# Patient Record
Sex: MALE | Race: WHITE | NOT HISPANIC OR LATINO | Employment: FULL TIME | URBAN - METROPOLITAN AREA
[De-identification: names, ages, dates, MRNs, and addresses within clinical notes are randomized per-mention and may not be internally consistent; named-entity substitution may affect disease eponyms.]

---

## 2017-05-08 ENCOUNTER — APPOINTMENT (OUTPATIENT)
Dept: WOMENS IMAGING | Facility: HOSPITAL | Age: 52
End: 2017-05-08

## 2017-05-08 PROCEDURE — 71035: CPT | Performed by: RADIOLOGY

## 2018-03-29 ENCOUNTER — OFFICE VISIT CONVERTED (OUTPATIENT)
Dept: FAMILY MEDICINE CLINIC | Facility: CLINIC | Age: 53
End: 2018-03-29
Attending: FAMILY MEDICINE

## 2018-07-02 ENCOUNTER — APPOINTMENT (OUTPATIENT)
Dept: WOMENS IMAGING | Facility: HOSPITAL | Age: 53
End: 2018-07-02

## 2018-07-02 PROCEDURE — 71045 X-RAY EXAM CHEST 1 VIEW: CPT | Performed by: RADIOLOGY

## 2018-07-25 ENCOUNTER — APPOINTMENT (OUTPATIENT)
Dept: WOMENS IMAGING | Facility: HOSPITAL | Age: 53
End: 2018-07-25

## 2018-07-25 PROCEDURE — 71047 X-RAY EXAM CHEST 3 VIEWS: CPT | Performed by: RADIOLOGY

## 2018-07-30 ENCOUNTER — OFFICE VISIT CONVERTED (OUTPATIENT)
Dept: FAMILY MEDICINE CLINIC | Facility: CLINIC | Age: 53
End: 2018-07-30
Attending: FAMILY MEDICINE

## 2018-10-08 ENCOUNTER — OFFICE VISIT CONVERTED (OUTPATIENT)
Dept: FAMILY MEDICINE CLINIC | Facility: CLINIC | Age: 53
End: 2018-10-08
Attending: NURSE PRACTITIONER

## 2018-10-08 ENCOUNTER — CONVERSION ENCOUNTER (OUTPATIENT)
Dept: FAMILY MEDICINE CLINIC | Facility: CLINIC | Age: 53
End: 2018-10-08

## 2018-11-15 ENCOUNTER — CONVERSION ENCOUNTER (OUTPATIENT)
Dept: FAMILY MEDICINE CLINIC | Facility: CLINIC | Age: 53
End: 2018-11-15

## 2018-11-27 ENCOUNTER — APPOINTMENT (OUTPATIENT)
Dept: WOMENS IMAGING | Facility: HOSPITAL | Age: 53
End: 2018-11-27

## 2018-11-27 PROCEDURE — 71045 X-RAY EXAM CHEST 1 VIEW: CPT | Performed by: RADIOLOGY

## 2019-01-15 ENCOUNTER — HOSPITAL ENCOUNTER (OUTPATIENT)
Dept: OTHER | Facility: HOSPITAL | Age: 54
Discharge: HOME | End: 2019-01-15

## 2019-01-15 LAB — PSA SERPL-MCNC: 0.25 NG/ML (ref 0–4)

## 2019-11-26 ENCOUNTER — HOSPITAL ENCOUNTER (OUTPATIENT)
Dept: FAMILY MEDICINE CLINIC | Facility: CLINIC | Age: 54
Discharge: HOME | End: 2019-11-26
Attending: NURSE PRACTITIONER

## 2019-11-26 ENCOUNTER — APPOINTMENT (OUTPATIENT)
Dept: WOMENS IMAGING | Facility: HOSPITAL | Age: 54
End: 2019-11-26

## 2019-12-05 VITALS
RESPIRATION RATE: 18 BRPM | DIASTOLIC BLOOD PRESSURE: 65 MMHG | DIASTOLIC BLOOD PRESSURE: 78 MMHG | SYSTOLIC BLOOD PRESSURE: 120 MMHG | HEART RATE: 75 BPM | SYSTOLIC BLOOD PRESSURE: 101 MMHG | DIASTOLIC BLOOD PRESSURE: 90 MMHG | DIASTOLIC BLOOD PRESSURE: 57 MMHG | DIASTOLIC BLOOD PRESSURE: 55 MMHG | SYSTOLIC BLOOD PRESSURE: 90 MMHG | RESPIRATION RATE: 16 BRPM | RESPIRATION RATE: 19 BRPM | HEART RATE: 79 BPM | DIASTOLIC BLOOD PRESSURE: 71 MMHG | SYSTOLIC BLOOD PRESSURE: 125 MMHG | OXYGEN SATURATION: 99 % | SYSTOLIC BLOOD PRESSURE: 98 MMHG | DIASTOLIC BLOOD PRESSURE: 75 MMHG | DIASTOLIC BLOOD PRESSURE: 69 MMHG | HEART RATE: 74 BPM | RESPIRATION RATE: 15 BRPM | HEIGHT: 71 IN | DIASTOLIC BLOOD PRESSURE: 61 MMHG | HEART RATE: 83 BPM | TEMPERATURE: 96.7 F | OXYGEN SATURATION: 91 % | HEART RATE: 76 BPM | DIASTOLIC BLOOD PRESSURE: 64 MMHG | DIASTOLIC BLOOD PRESSURE: 62 MMHG | DIASTOLIC BLOOD PRESSURE: 83 MMHG | RESPIRATION RATE: 20 BRPM | SYSTOLIC BLOOD PRESSURE: 99 MMHG | SYSTOLIC BLOOD PRESSURE: 96 MMHG | DIASTOLIC BLOOD PRESSURE: 60 MMHG | HEART RATE: 71 BPM | OXYGEN SATURATION: 95 % | WEIGHT: 215 LBS | RESPIRATION RATE: 21 BRPM | OXYGEN SATURATION: 93 % | SYSTOLIC BLOOD PRESSURE: 106 MMHG | OXYGEN SATURATION: 96 % | DIASTOLIC BLOOD PRESSURE: 56 MMHG | OXYGEN SATURATION: 98 % | RESPIRATION RATE: 22 BRPM | SYSTOLIC BLOOD PRESSURE: 112 MMHG | HEART RATE: 73 BPM | SYSTOLIC BLOOD PRESSURE: 100 MMHG | OXYGEN SATURATION: 97 % | TEMPERATURE: 97 F | HEART RATE: 86 BPM | SYSTOLIC BLOOD PRESSURE: 123 MMHG | HEART RATE: 85 BPM

## 2019-12-06 ENCOUNTER — OFFICE (AMBULATORY)
Dept: URBAN - METROPOLITAN AREA PATHOLOGY 4 | Facility: PATHOLOGY | Age: 54
End: 2019-12-06
Payer: COMMERCIAL

## 2019-12-06 ENCOUNTER — AMBULATORY SURGICAL CENTER (AMBULATORY)
Dept: URBAN - METROPOLITAN AREA SURGERY 17 | Facility: SURGERY | Age: 54
End: 2019-12-06
Payer: COMMERCIAL

## 2019-12-06 DIAGNOSIS — D12.2 BENIGN NEOPLASM OF ASCENDING COLON: ICD-10-CM

## 2019-12-06 DIAGNOSIS — Z86.010 PERSONAL HISTORY OF COLONIC POLYPS: ICD-10-CM

## 2019-12-06 DIAGNOSIS — K57.30 DIVERTICULOSIS OF LARGE INTESTINE WITHOUT PERFORATION OR ABS: ICD-10-CM

## 2019-12-06 LAB
GI HISTOLOGY: A. UNSPECIFIED: (no result)
GI HISTOLOGY: PDF REPORT: (no result)

## 2019-12-06 PROCEDURE — 45385 COLONOSCOPY W/LESION REMOVAL: CPT | Mod: 33 | Performed by: INTERNAL MEDICINE

## 2019-12-06 PROCEDURE — 88305 TISSUE EXAM BY PATHOLOGIST: CPT | Mod: 33 | Performed by: INTERNAL MEDICINE

## 2019-12-06 NOTE — SERVICEHPINOTES
53 yo M with h/o HTN, colon polyps. Last CN 12/2016 with multiple polyps removed on previous colonoscopy. GM with liver dz, ovarian ca. He denies having any GI symptoms.Discussed r/b/a prior

## 2020-09-25 ENCOUNTER — HOSPITAL ENCOUNTER (OUTPATIENT)
Dept: OTHER | Facility: HOSPITAL | Age: 55
Discharge: HOME | End: 2020-09-25

## 2020-10-06 PROCEDURE — U0003 INFECTIOUS AGENT DETECTION BY NUCLEIC ACID (DNA OR RNA); SEVERE ACUTE RESPIRATORY SYNDROME CORONAVIRUS 2 (SARS-COV-2) (CORONAVIRUS DISEASE [COVID-19]), AMPLIFIED PROBE TECHNIQUE, MAKING USE OF HIGH THROUGHPUT TECHNOLOGIES AS DESCRIBED BY CMS-2020-01-R: HCPCS | Performed by: NURSE PRACTITIONER

## 2021-02-02 ENCOUNTER — HOSPITAL ENCOUNTER (OUTPATIENT)
Dept: FAMILY MEDICINE CLINIC | Facility: CLINIC | Age: 56
Discharge: HOME | End: 2021-02-02
Attending: NURSE PRACTITIONER

## 2021-05-07 NOTE — PROGRESS NOTES
Progress Note      Patient Name: Patel Monreal   Patient ID: [id number]   Sex: Male   YOB: 1965        Visit Date: July 30, 2018    Provider: Deneen Escobar MD   Location: [facility]   Location Address: [address] [place] [address]  [id number]   Location Phone: [phone]          Chief Complaint     Wellness Exam       History Of Present Illness  Patel Monreal is a 53 year old /White male who presents for evaluation and treatment of:      Wellness exam    Patient comes in for his wellness exam. He denies complaints or concerns at this time. His last colonoscopy was 2 years ago (few polyps per patient; return in 5 years; had colonoscopy 1 year prior with multiple polyps so they had him return 1 year later. He denies blood or melena in stools or unintentional weight loss. Dad alive at 87 yo with HTN; CVA 87 yo. Mom alive at 83 yo with HTN.          Past Medical History  Disease Name Date Onset Notes   BPH with retention --  --          Past Surgical History  Procedure Name Date Notes   Left knee arthroscopy with debridement 1996 --    Right knee arthroscopy with debridement 1994, 1998 --    Right shoulder arthroscopy 2000 --          Medication List  Name Date Started Instructions   alfuzosin 10 mg oral tablet extended release 24 hr  take 1 tablet (10 mg) by oral route once daily   aspirin oral  --          Allergy List  Allergen Name Date Reaction Notes   NO KNOWN DRUG ALLERGIES --  --  --          Social History  Finding Status Start/Stop Quantity Notes   Tobacco Never --/-- --  --          Review of Systems  · Constitutional  o Denies  o : fever, headache, chills  · Eyes  o Denies  o : eye pain, changes in vision, double vision  · HENT  o Denies  o : nasal congestion, nasal discharge, postnasal drip, sore throat, tinnitus, ear pain  · Breasts  o Denies  o : lumps  · Cardiovascular  o Denies  o : chest pain, palpitations  · Respiratory  o Denies  o : shortness of  "breath, wheezing, cough  · Gastrointestinal  o Denies  o : nausea, vomiting, abdominal pain, blood in stools  · Genitourinary  o Denies  o : urgency, frequency, dysuria, hematuria  · Integument  o Denies  o : rash      Vitals  Date Time BP Position Site L\R Cuff Size HR RR TEMP(F) WT  HT  BMI kg/m2 BSA m2 O2 Sat HC       07/30/2018 08:37 /74 Sitting    74 - R  97.6 227lbs 0oz 5'  11\" 31.66 2.27 96 %           Physical Examination  · Constitutional  o Appearance  o : well developed, well-nourished, no acute distress  · Eyes  o Conjunctivae  o : conjunctivae normal  · Ears, Nose, Mouth and Throat  o Nose  o :   § External Nose  § : no lesions noted  · Neck  o Inspection/Palpation  o : normal appearance, trachea midline, neck supple  · Respiratory  o Respiratory Effort  o : breathing unlabored  o Inspection of Chest  o : chest rise symmetric bilaterally  o Auscultation of Lungs  o : clear to auscultation bilaterally throughout inspiration and expiration; no wheezes, rhonchi, rales, rubs  · Cardiovascular  o Heart  o :   § Auscultation of Heart  § : regular rate and rhythm, no murmurs, gallops or rubs  o Peripheral Vascular System  o :   § Extremities  § : no edema  · Psychiatric  o Mood and Affect  o : mood normal, affect appropriate              Assessment  · Wellness examination     V70.0/Z00.00  Patient had plant labs in April 2018 so will get a copy of those. Needs Hep C with next blood draw. All other screening UTD.      Plan  · Orders  o ACO-39: Current medications updated and reviewed () - V70.0/Z00.00 - 07/30/2018  o ACO-18: Negative screen for clinical depression using a standardized tool () - V70.0/Z00.00 - 07/30/2018  · Instructions  o Patient was educated/instructed on their diagnosis, treatment and medications prior to discharge from the clinic today.            Electronically Signed by: Deneen Escobar MD -Author on July 30, 2018 10:32:26 AM  "

## 2021-05-07 NOTE — PROGRESS NOTES
Progress Note      Patient Name: Patel Monreal   Patient ID: [id number]   Sex: Male   YOB: 1965        Visit Date: October 8, 2018    Provider: MANDEEP Parker   Location: [facility]   Location Address: [address] [place] [address]  [id number]   Location Phone: [phone]          Chief Complaint     PATIENT IS HERE FOR A RETURN TO WORK FROM HAVING SURGERY FOR A TRIGGER FINGER.       History Of Present Illness  Patel Monreal is a 53 year old /White male who presents for evaluation and treatment of:      RTW     He had surgery for trigger finger right hand  Sept 21. Saw  9-28. He was given 1 week of light duty, then RTW 10-5 no restrictions.     He works in Maintenance. Right hand dominant.  is not as good. Painful to . Cannot totally flex finger yet.  He is concerned he will not be able to  pipe wrench adequately without dropping something.            Past Medical History  Disease Name Date Onset Notes   BPH with retention --  --          Past Surgical History  Procedure Name Date Notes   Left knee arthroscopy with debridement 1996 --    Right knee arthroscopy with debridement 1994, 1998 --    Right shoulder arthroscopy 2000 --          Medication List  Name Date Started Instructions   alfuzosin 10 mg oral tablet extended release 24 hr  take 1 tablet (10 mg) by oral route once daily   aspirin oral  --          Allergy List  Allergen Name Date Reaction Notes   NO KNOWN DRUG ALLERGIES --  --  --          Social History  Finding Status Start/Stop Quantity Notes   Tobacco Never --/-- --  --          Review of Systems  · Constitutional  o Admits  o : good general health lately  · Integument  o Admits  o : scar is well healed  · Musculoskeletal  o Admits  o : limited ROM to middle finger, still painful with       Vitals  Date Time BP Position Site L\R Cuff Size HR RR TEMP(F) WT  HT  BMI kg/m2 BSA m2 O2 Sat HC       10/08/2018 08:22 /80  "Sitting    105 - R  97.4 228lbs 4oz 5'  11\" 31.83 2.28 97 %           Physical Examination  · Constitutional  o Appearance  o : well developed, well-nourished, in no acute distress  · Head and Face  o HEENT  o : Unremarkable  · Eyes  o Conjunctivae  o : conjunctivae normal  o Pupils and Irises  o : pupils equal and round  · Ears, Nose, Mouth and Throat  o Ears  o :   § External Ears  § : appearance within normal limits, no lesions present  o Nose  o :   § External Nose  § : appearance normal  o Oral Cavity  o :   § Oral Mucosa  § : oral mucosa normal  § Lips  § : lip appearance normal  · Neck  o Inspection/Palpation  o : supple  · Respiratory  o Respiratory Effort  o : breathing unlabored  o Auscultation of Lungs  o : clear to ascultation  · Cardiovascular  o Heart  o :   § Auscultation of Heart  § : regular rate and rhythm  o Peripheral Vascular System  o :   § Extremities  § : right hand: fingers with good capillary refill  · Musculoskeletal  o General  o :   § General Musculoskeletal  § : Muscle tone, strength, and development grossly normal.  o Extremeties/Joint  o : Right Hand:  is approximately 50 % less than left. Cannot close hand completely. C/O pain with gripping my fingers. Slightly swollen.   · Skin and Subcutaneous Tissue  o General Inspection  o : warm and dry, not obvious abnormal lesions, scar on palm is well healed  · Neurologic  o Gait and Station  o :   § Gait Screening  § : normal gait  · Psychiatric  o Mood and Affect  o : mood normal, affect appropriate          Assessment  · Return to work exam     V72.85/Z76.89  · Trigger finger, right middle finger     727.03/M65.331      Plan  · Orders  o ACO-39: Current medications updated and reviewed () - - 10/08/2018  · Instructions  o Patient was educated/instructed on their diagnosis, treatment and medications prior to discharge from the clinic today.  o He does not appear prepared to return to work at this time. He is still experiencing pain " with gripping of the right hand. He is right-hand dominant. 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 I advised that he should follow up with his surgeon. He needs to discuss his work responsibilities. Perhaps request additional time off to improve flexibility and  strength of hand without significant pain.  o After patient returns home and contacts his surgeon's office, he calls back to say his surgeon is out of the country and will be unable to see him for several days. On second thought, patient thinks he will be able to return to work. I ask him to go into his garage and to  some tools and use them to assess his ability to hold the tool and use it effectively. He returns the call later in the day to report he has been able to do so by holding his finger slightly extended. I will return him to work.            Electronically Signed by: MANDEEP Parker -Author on October 8, 2018 10:16:10 AM

## 2021-05-07 NOTE — PROGRESS NOTES
Progress Note      Patient Name: Patel Monreal   Patient ID: [id number]   Sex: Male   YOB: 1965        Visit Date: March 29, 2018    Provider: Deneen Escobar MD   Location: [facility]   Location Address: [address] [place] [address]  [id number]   Location Phone: [phone]          Chief Complaint     Splinter in left hand X 2 weeks       History Of Present Illness  Patel Monreal is a 52 year old /White male who presents for evaluation and treatment of:      Splinter in left hand    Patient reports getting a splinter in his left hand 2 weeks ago. He is sure there is a splinter as he remembers the exact moment it happened (and he had other splinters as well). His daughter tried digging it out and he tried soaking his hand in peroxide but the splinter remains. Pain with using that hand (he has a trigger finger in his right hand so has been preferentially using the left). Denies fevers or erythema/drainage.          Medication List  Name Date Started Instructions   alfuzosin 10 mg oral tablet extended release 24 hr  take 1 tablet (10 mg) by oral route once daily   diclofenac sodium 75 mg oral tablet,delayed release (DR/EC)  take 1 tablet (75 mg) by oral route 2 times per day as needed         Allergy List  Allergen Name Date Reaction Notes   NO KNOWN DRUG ALLERGIES --  --  --          Review of Systems  · Constitutional  o Denies  o : fever, headache, chills  · Eyes  o Denies  o : eye pain, changes in vision, double vision  · HENT  o Denies  o : nasal congestion, nasal discharge, postnasal drip, sore throat, tinnitus, ear pain  · Breasts  o Denies  o : lumps  · Cardiovascular  o Denies  o : chest pain, palpitations  · Respiratory  o Denies  o : shortness of breath, wheezing, cough  · Gastrointestinal  o Denies  o : nausea, vomiting, abdominal pain, blood in stools  · Genitourinary  o Denies  o : urgency, frequency, dysuria, hematuria  · Integument  o Denies  o :  "rash      Vitals  Date Time BP Position Site L\R Cuff Size HR RR TEMP(F) WT  HT  BMI kg/m2 BSA m2 O2 Sat HC       03/29/2018 04:42 /78 Sitting    78 - R   238lbs 0oz 5'  11\" 33.19 2.33 95 %           Physical Examination  · Constitutional  o Appearance  o : well developed, well-nourished, in no acute distress  · Eyes  o Conjunctivae  o : Conjunctivae normal  · Neck  o Inspection/Palpation  o : neck supple  · Respiratory  o Respiratory Effort  o : breathing unlabored  · Psychiatric  o General  o : normal affect and calm     LEFT HAND:  splinter present palmar aspect of hand below 2nd finger; callus forming around splinter (small, black speck visualized); no surrounding erythema or ecchymosis; no surrounding fluid or pus or swelling                 Assessment  · Splinter     919.6/T14.8XXA  With an 11 blade, callus was cut away from around the splinter. Splinted noted to be gone after cutting away callus and patient felt relief of symptoms following procedure. Discussed follow-up if symptoms persist.      Plan  · Orders  o ACO-39: Current medications updated and reviewed () - - 03/29/2018  · Instructions  o Patient was educated/instructed on their diagnosis, treatment and medications prior to discharge from the clinic today.  · Disposition  o Call or Return if symptoms worsen or persist.            Electronically Signed by: Deneen Escobar MD -Author on March 29, 2018 05:37:26 PM  "

## 2021-05-09 VITALS
WEIGHT: 233 LBS | TEMPERATURE: 98.3 F | SYSTOLIC BLOOD PRESSURE: 128 MMHG | HEART RATE: 84 BPM | BODY MASS INDEX: 32.62 KG/M2 | DIASTOLIC BLOOD PRESSURE: 84 MMHG | OXYGEN SATURATION: 96 % | HEIGHT: 71 IN

## 2021-05-09 VITALS
BODY MASS INDEX: 31.95 KG/M2 | HEIGHT: 71 IN | TEMPERATURE: 97.4 F | OXYGEN SATURATION: 97 % | HEART RATE: 105 BPM | WEIGHT: 228.25 LBS | SYSTOLIC BLOOD PRESSURE: 132 MMHG | DIASTOLIC BLOOD PRESSURE: 80 MMHG

## 2021-05-09 VITALS
DIASTOLIC BLOOD PRESSURE: 74 MMHG | SYSTOLIC BLOOD PRESSURE: 112 MMHG | WEIGHT: 227 LBS | TEMPERATURE: 97.6 F | HEIGHT: 71 IN | OXYGEN SATURATION: 96 % | BODY MASS INDEX: 31.78 KG/M2 | HEART RATE: 74 BPM

## 2021-05-09 VITALS
BODY MASS INDEX: 33.32 KG/M2 | WEIGHT: 238 LBS | OXYGEN SATURATION: 95 % | DIASTOLIC BLOOD PRESSURE: 78 MMHG | HEIGHT: 71 IN | HEART RATE: 78 BPM | SYSTOLIC BLOOD PRESSURE: 118 MMHG

## 2022-05-12 ENCOUNTER — CLINICAL SUPPORT (OUTPATIENT)
Dept: FAMILY MEDICINE CLINIC | Facility: CLINIC | Age: 57
End: 2022-05-12

## 2022-05-12 DIAGNOSIS — Z13.9 SCREENING DUE: Primary | ICD-10-CM

## 2022-05-24 ENCOUNTER — OFFICE VISIT (OUTPATIENT)
Dept: ORTHOPEDIC SURGERY | Facility: CLINIC | Age: 57
End: 2022-05-24

## 2022-05-24 VITALS — WEIGHT: 223.4 LBS | HEIGHT: 71 IN | BODY MASS INDEX: 31.27 KG/M2 | HEART RATE: 84 BPM | OXYGEN SATURATION: 98 %

## 2022-05-24 DIAGNOSIS — S83.242A ACUTE MEDIAL MENISCUS TEAR OF LEFT KNEE, INITIAL ENCOUNTER: ICD-10-CM

## 2022-05-24 DIAGNOSIS — M25.562 LEFT KNEE PAIN, UNSPECIFIED CHRONICITY: Primary | ICD-10-CM

## 2022-05-24 PROCEDURE — 99203 OFFICE O/P NEW LOW 30 MIN: CPT | Performed by: ORTHOPAEDIC SURGERY

## 2022-05-24 NOTE — PROGRESS NOTES
"Chief Complaint  Initial Evaluation of the Left Knee     Subjective      Patel Monreal presents to [facility] for evaluation of the left knee. He reports left knee pain. He has pain the medial knee with for about 2 months. 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 He has no injury or trauma.     No Known Allergies     Social History     Socioeconomic History   • Marital status: Unknown   Tobacco Use   • Smoking status: Never Smoker   • Smokeless tobacco: Former User     Types: Chew   Vaping Use   • Vaping Use: Never used   Substance and Sexual Activity   • Alcohol use: Yes     Comment: rare        Review of Systems     Objective   Vital Signs:   Pulse 84   Ht 180.3 cm (71\")   Wt 101 kg (223 lb 6.4 oz)   SpO2 98%   BMI 31.16 kg/m²       Physical Exam  Constitutional:       Appearance: Normal appearance. The patient is well-developed and normal weight.   HENT:      Head: Normocephalic.      Right Ear: Hearing and external ear normal.      Left Ear: Hearing and external ear normal.      Nose: Nose normal.   Eyes:      Conjunctiva/sclera: Conjunctivae normal.   Cardiovascular:      Rate and Rhythm: Normal rate.   Pulmonary:      Effort: Pulmonary effort is normal.      Breath sounds: No wheezing or rales.   Abdominal:      Palpations: Abdomen is soft.      Tenderness: There is no abdominal tenderness.   Musculoskeletal:      Cervical back: Normal range of motion.   Skin:     Findings: No rash.   Neurological:      Mental Status: The patient is alert and oriented to person, place, and time.   Psychiatric:         Mood and Affect: Mood and affect normal.         Judgment: Judgment normal.       Ortho Exam      Left knee- ROM 0-135 degrees. Stable to varus/valgus stress. Stable to anterior/posterior drawer. Negative Lachman's. Pain with Araseli's. Positive EHL, FHL, GS and TA. Sensation intact to all 5 nerves of the foot. Positive pulses. Knee Extensor Mechanism  " Intact.     Procedures    X-Ray Report:  Left knee(s) X-Ray  Indication: Evaluation of left knee pain  AP, Lateral, Standing and Sunrise view(s)  Findings: no acute fracture. No dislocation or subluxation. No significant degenerative changes.   Prior studies available for comparison: no         Imaging Results (Most Recent)     Procedure Component Value Units Date/Time    XR Knee 3 View Left [[id number]] Resulted: 05/24/22 1620     Updated: 05/24/22 1623           Result Review :       XR Chest 1 View    Result Date: 5/12/2022  Narrative: PROCEDURE: XR CHEST 1 VW  COMPARISON: [facility], CR, AP/PA CHEST FOR ASBESTOS SCREENING, 11/26/2019, 15:52.  [facility], CR, AP/PA CHEST FOR ASBESTOS SCREENING, 2/02/2021, 15:46.  INDICATIONS: screening  FINDINGS:   The lungs are well-expanded. The heart and pulmonary vasculature are within normal limits. No pleural effusions are identified. There are no active appearing infiltrates.  IMPRESSION: No active disease.  NILSA DARDEN MD       Electronically Signed and Approved By: NILSA DARDEN MD on 5/12/2022 at 17:13                      Assessment and Plan     Diagnoses and all orders for this visit:    1. Left knee pain, unspecified chronicity (Primary)  -     XR Knee 3 View Left    2. Acute medial meniscus tear of left knee, initial encounter      Discussed the treatment plan with the patient.  I reviewed the x-rays that were obtained today with the patient. The patient has pain with Araseli Testing. Plan for MRI of the left knee to evaluate the medial meniscus.     Call or return if worsening symptoms.    Follow Up     After MRI      Patient was given instructions and counseling regarding his condition or for health maintenance advice. Please see specific information pulled into the AVS if appropriate.     Scribed for Major Hickman MD by Lucia Peters.  05/24/22   16:37 EDT    I have personally performed the services  described in this document as scribed by the above individual and it is both accurate and complete. Major Hickman MD 05/25/22

## 2022-06-09 ENCOUNTER — HOSPITAL ENCOUNTER (OUTPATIENT)
Dept: MRI IMAGING | Facility: HOSPITAL | Age: 57
Discharge: HOME | End: 2022-06-09
Admitting: ORTHOPAEDIC SURGERY

## 2022-06-09 DIAGNOSIS — S83.242A ACUTE MEDIAL MENISCUS TEAR OF LEFT KNEE, INITIAL ENCOUNTER: ICD-10-CM

## 2022-06-09 DIAGNOSIS — M25.562 LEFT KNEE PAIN, UNSPECIFIED CHRONICITY: ICD-10-CM

## 2022-06-09 PROCEDURE — 73721 MRI JNT OF LWR EXTRE W/O DYE: CPT

## 2022-06-14 ENCOUNTER — OFFICE VISIT (OUTPATIENT)
Dept: ORTHOPEDIC SURGERY | Facility: CLINIC | Age: 57
End: 2022-06-14

## 2022-06-14 VITALS — BODY MASS INDEX: 31.36 KG/M2 | HEART RATE: 92 BPM | HEIGHT: 71 IN | WEIGHT: 224 LBS | OXYGEN SATURATION: 96 %

## 2022-06-14 DIAGNOSIS — S83.242S ACUTE MEDIAL MENISCUS TEAR OF LEFT KNEE, SEQUELA: ICD-10-CM

## 2022-06-14 DIAGNOSIS — I73.89: ICD-10-CM

## 2022-06-14 DIAGNOSIS — M71.22 BAKER CYST, LEFT: Primary | ICD-10-CM

## 2022-06-14 DIAGNOSIS — M17.12 PRIMARY OSTEOARTHRITIS OF LEFT KNEE: ICD-10-CM

## 2022-06-14 PROCEDURE — 99213 OFFICE O/P EST LOW 20 MIN: CPT | Performed by: ORTHOPAEDIC SURGERY

## 2022-06-14 PROCEDURE — 20610 DRAIN/INJ JOINT/BURSA W/O US: CPT | Performed by: ORTHOPAEDIC SURGERY

## 2022-06-14 RX ADMIN — TRIAMCINOLONE ACETONIDE 40 MG: 40 INJECTION, SUSPENSION INTRA-ARTICULAR; INTRAMUSCULAR at 16:33

## 2022-06-14 RX ADMIN — LIDOCAINE HYDROCHLORIDE 5 ML: 10 INJECTION, SOLUTION INFILTRATION; PERINEURAL at 16:33

## 2022-06-14 NOTE — PROGRESS NOTES
"Chief Complaint  Pain and Follow-up of the Left Knee     Subjective      Patel Monreal presents to [facility] for follow up evaluation of the left knee. The patient recently had an MRI and is here today for those results. To review, He has pain the medial left knee with for about 2 months. 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 He has no injury or trauma.     No Known Allergies     Social History     Socioeconomic History   • Marital status: Unknown   Tobacco Use   • Smoking status: Never Smoker   • Smokeless tobacco: Former User     Types: Chew   Vaping Use   • Vaping Use: Never used   Substance and Sexual Activity   • Alcohol use: Yes     Comment: rare        Review of Systems     Objective   Vital Signs:   Pulse 92   Ht 180.3 cm (71\")   Wt 102 kg (224 lb)   SpO2 96%   BMI 31.24 kg/m²       Physical Exam  Constitutional:       Appearance: Normal appearance. The patient is well-developed and normal weight.   HENT:      Head: Normocephalic.      Right Ear: Hearing and external ear normal.      Left Ear: Hearing and external ear normal.      Nose: Nose normal.   Eyes:      Conjunctiva/sclera: Conjunctivae normal.   Cardiovascular:      Rate and Rhythm: Normal rate.   Pulmonary:      Effort: Pulmonary effort is normal.      Breath sounds: No wheezing or rales.   Abdominal:      Palpations: Abdomen is soft.      Tenderness: There is no abdominal tenderness.   Musculoskeletal:      Cervical back: Normal range of motion.   Skin:     Findings: No rash.   Neurological:      Mental Status: The patient is alert and oriented to person, place, and time.   Psychiatric:         Mood and Affect: Mood and affect normal.         Judgment: Judgment normal.       Ortho Exam      Left knee- ROM 0-135 degrees. Stable to varus/valgus stress. Stable to anterior/posterior drawer. Negative Lachman's. Pain with Araseli's. Positive EHL, FHL, GS and TA. Sensation intact to all 5 nerves " of the foot. Positive pulses. Knee Extensor Mechanism  Intact.     Large Joint Arthrocentesis: L knee  Date/Time: 6/14/2022 4:33 PM  Consent given by: patient  Site marked: site marked  Timeout: Immediately prior to procedure a time out was called to verify the correct patient, procedure, equipment, support staff and site/side marked as required   Supporting Documentation  Indications: pain   Procedure Details  Location: knee - L knee  Needle gauge: 21G.  Medications administered: 40 mg triamcinolone acetonide 40 MG/ML; 5 mL lidocaine 1 %  Patient tolerance: patient tolerated the procedure well with no immediate complications          Imaging Results (Most Recent)     None           Result Review :       XR Knee 3 View Left    Result Date: 5/24/2022  Narrative:   X-Ray Report: Left knee(s) X-Ray Indication: Evaluation of left knee pain AP, Lateral, Standing and Sunrise view(s) Findings: no acute fracture. No dislocation or subluxation. No significant degenerative changes. Prior studies available for comparison: no     MRI Knee Left Without Contrast    Result Date: 6/10/2022  Narrative: PROCEDURE: MRI KNEE LEFT  WO CONTRAST  COMPARISON: [facility], MR, KNEE LEFT, 8/25/2011, 15:56.  [facility] , CR, XR KNEE 3 VW LEFT, 5/24/2022, 16:25.  INDICATIONS: LEFT KNEE MMT      TECHNIQUE: A complete multi-planar MRI was performed.   FINDINGS:  No fracture or malalignment is identified.  Marrow signal appears normal.  There is a complex tear of the medial meniscal posterior horn exiting the superior and inferior surfaces.  Postsurgical changes are noted in Hoffa's fat pad.  The medial meniscal body appears diminutive, possibly related to a previous partial meniscectomy and or tear.  A similar appearance was noted on the 8/25/2011 exam.  No tear of the lateral meniscus is seen.  The cruciate ligaments, medial collateral ligament, lateral collateral ligament complex, patellar retinacula and extensor  mechanism appear unremarkable.  There is full-thickness fissuring of the patellar cartilage along the superior vertical ridge.  Cartilage signal changes in the medial and lateral compartments is consistent with chondromalacia.  Mild to moderate cartilage thinning is noted in the medial and lateral compartments.  No loose body is seen.  A 1.1 cm popliteal cyst is present.  Mild prepatellar edema is noted.  Along the anterior aspect of the popliteal artery is a 1.0 cm x 2.0 cm rounded T2 high signal focus.  Intravenous contrast was not given to assess for abnormal enhancement.      Impression:   1. Chronic tears of the medial meniscal body and posterior horn.  Correlate clinically for a history of partial meniscectomy 2. Mild osteoarthrosis 3. 1.1 cm popliteal cyst 4. 2.0 cm x 1.0 cm cystic focus along the anterior aspect of the popliteal artery.  This is suspected to represent cystic adventitial disease arising from the artery.  A synovial cyst or ganglion is also in the differential.     Simón Christie M.D.       Electronically Signed and Approved By: Simón Christie M.D. on 6/10/2022 at 13:15                      Assessment and Plan     Diagnoses and all orders for this visit:    1. Baker cyst, left (Primary)    2. Primary osteoarthritis of left knee    3. Acute medial meniscus tear of left knee, sequela    4. 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  I reviewed the MRI with the patient. Plan for a referral to a vascular Doctor about the findings concerning the artery. Discussed the risks and benefits of a left knee steroid injection. The patient expressed understanding and wished to proceed. He tolerated the injection well.     Call or return if worsening symptoms.    Follow Up     6 weeks    Patient was given instructions and counseling regarding his condition or for health maintenance advice. Please see specific information pulled into the AVS if  appropriate.     Scribed for Major Hickman MD by Lucia Peters.  06/14/22   16:12 EDT    I have personally performed the services described in this document as scribed by the above individual and it is both accurate and complete. Major Hickman MD 06/15/22

## 2022-06-15 RX ORDER — TRIAMCINOLONE ACETONIDE 40 MG/ML
40 INJECTION, SUSPENSION INTRA-ARTICULAR; INTRAMUSCULAR
Status: COMPLETED | OUTPATIENT
Start: 2022-06-14 | End: 2022-06-14

## 2022-06-15 RX ORDER — LIDOCAINE HYDROCHLORIDE 10 MG/ML
5 INJECTION, SOLUTION INFILTRATION; PERINEURAL
Status: COMPLETED | OUTPATIENT
Start: 2022-06-14 | End: 2022-06-14

## 2022-07-04 ENCOUNTER — HOSPITAL ENCOUNTER (EMERGENCY)
Facility: HOSPITAL | Age: 57
Discharge: HOME | End: 2022-07-04
Attending: EMERGENCY MEDICINE | Admitting: EMERGENCY MEDICINE

## 2022-07-04 ENCOUNTER — APPOINTMENT (OUTPATIENT)
Dept: CT IMAGING | Facility: HOSPITAL | Age: 57
End: 2022-07-04

## 2022-07-04 VITALS
HEART RATE: 80 BPM | DIASTOLIC BLOOD PRESSURE: 85 MMHG | RESPIRATION RATE: 15 BRPM | OXYGEN SATURATION: 95 % | SYSTOLIC BLOOD PRESSURE: 131 MMHG | TEMPERATURE: 97.9 F

## 2022-07-04 DIAGNOSIS — K57.92 DIVERTICULITIS: Primary | ICD-10-CM

## 2022-07-04 LAB
ALBUMIN SERPL-MCNC: 4.3 G/DL (ref 3.5–5.2)
ALBUMIN/GLOB SERPL: 1.3 G/DL
ALP SERPL-CCNC: 69 U/L (ref 39–117)
ALT SERPL W P-5'-P-CCNC: 45 U/L (ref 1–41)
ANION GAP SERPL CALCULATED.3IONS-SCNC: 9.5 MMOL/L (ref 5–15)
AST SERPL-CCNC: 28 U/L (ref 1–40)
BASOPHILS # BLD AUTO: 0.06 10*3/MM3 (ref 0–0.2)
BASOPHILS NFR BLD AUTO: 0.5 % (ref 0–1.5)
BILIRUB SERPL-MCNC: 0.8 MG/DL (ref 0–1.2)
BILIRUB UR QL STRIP: NEGATIVE
BUN SERPL-MCNC: 13 MG/DL (ref 6–20)
BUN/CREAT SERPL: 13.5 (ref 7–25)
CALCIUM SPEC-SCNC: 9.6 MG/DL (ref 8.6–10.5)
CHLORIDE SERPL-SCNC: 105 MMOL/L (ref 98–107)
CLARITY UR: CLEAR
CO2 SERPL-SCNC: 25.5 MMOL/L (ref 22–29)
COLOR UR: YELLOW
CREAT SERPL-MCNC: 0.96 MG/DL (ref 0.76–1.27)
DEPRECATED RDW RBC AUTO: 38.8 FL (ref 37–54)
EGFRCR SERPLBLD CKD-EPI 2021: 92.2 ML/MIN/1.73
EOSINOPHIL # BLD AUTO: 0.08 10*3/MM3 (ref 0–0.4)
EOSINOPHIL NFR BLD AUTO: 0.6 % (ref 0.3–6.2)
ERYTHROCYTE [DISTWIDTH] IN BLOOD BY AUTOMATED COUNT: 12.6 % (ref 12.3–15.4)
GLOBULIN UR ELPH-MCNC: 3.3 GM/DL
GLUCOSE SERPL-MCNC: 101 MG/DL (ref 65–99)
GLUCOSE UR STRIP-MCNC: NEGATIVE MG/DL
HCT VFR BLD AUTO: 44 % (ref 37.5–51)
HGB BLD-MCNC: 15 G/DL (ref 13–17.7)
HGB UR QL STRIP.AUTO: NEGATIVE
HOLD SPECIMEN: NORMAL
IMM GRANULOCYTES # BLD AUTO: 0.07 10*3/MM3 (ref 0–0.05)
IMM GRANULOCYTES NFR BLD AUTO: 0.6 % (ref 0–0.5)
KETONES UR QL STRIP: NEGATIVE
LEUKOCYTE ESTERASE UR QL STRIP.AUTO: NEGATIVE
LIPASE SERPL-CCNC: 36 U/L (ref 13–60)
LYMPHOCYTES # BLD AUTO: 1.55 10*3/MM3 (ref 0.7–3.1)
LYMPHOCYTES NFR BLD AUTO: 12.5 % (ref 19.6–45.3)
MCH RBC QN AUTO: 29.4 PG (ref 26.6–33)
MCHC RBC AUTO-ENTMCNC: 34.1 G/DL (ref 31.5–35.7)
MCV RBC AUTO: 86.3 FL (ref 79–97)
MONOCYTES # BLD AUTO: 1.09 10*3/MM3 (ref 0.1–0.9)
MONOCYTES NFR BLD AUTO: 8.8 % (ref 5–12)
NEUTROPHILS NFR BLD AUTO: 77 % (ref 42.7–76)
NEUTROPHILS NFR BLD AUTO: 9.53 10*3/MM3 (ref 1.7–7)
NITRITE UR QL STRIP: NEGATIVE
NRBC BLD AUTO-RTO: 0 /100 WBC (ref 0–0.2)
PH UR STRIP.AUTO: 7.5 [PH] (ref 5–8)
PLATELET # BLD AUTO: 221 10*3/MM3 (ref 140–450)
PMV BLD AUTO: 9.4 FL (ref 6–12)
POTASSIUM SERPL-SCNC: 4 MMOL/L (ref 3.5–5.2)
PROT SERPL-MCNC: 7.6 G/DL (ref 6–8.5)
PROT UR QL STRIP: NEGATIVE
RBC # BLD AUTO: 5.1 10*6/MM3 (ref 4.14–5.8)
SODIUM SERPL-SCNC: 140 MMOL/L (ref 136–145)
SP GR UR STRIP: 1.01 (ref 1–1.03)
UROBILINOGEN UR QL STRIP: NORMAL
WBC NRBC COR # BLD: 12.38 10*3/MM3 (ref 3.4–10.8)
WHOLE BLOOD HOLD COAG: NORMAL
WHOLE BLOOD HOLD SPECIMEN: NORMAL

## 2022-07-04 PROCEDURE — 25010000002 IOPAMIDOL 61 % SOLUTION: Performed by: EMERGENCY MEDICINE

## 2022-07-04 PROCEDURE — 74177 CT ABD & PELVIS W/CONTRAST: CPT

## 2022-07-04 PROCEDURE — 80053 COMPREHEN METABOLIC PANEL: CPT | Performed by: PHYSICIAN ASSISTANT

## 2022-07-04 PROCEDURE — 85025 COMPLETE CBC W/AUTO DIFF WBC: CPT | Performed by: PHYSICIAN ASSISTANT

## 2022-07-04 PROCEDURE — 99283 EMERGENCY DEPT VISIT LOW MDM: CPT

## 2022-07-04 PROCEDURE — 83690 ASSAY OF LIPASE: CPT | Performed by: PHYSICIAN ASSISTANT

## 2022-07-04 PROCEDURE — 81003 URINALYSIS AUTO W/O SCOPE: CPT | Performed by: PHYSICIAN ASSISTANT

## 2022-07-04 RX ORDER — AMOXICILLIN AND CLAVULANATE POTASSIUM 875; 125 MG/1; MG/1
1 TABLET, FILM COATED ORAL EVERY 12 HOURS
Qty: 20 TABLET | Refills: 0 | Status: SHIPPED | OUTPATIENT
Start: 2022-07-04 | End: 2022-12-13

## 2022-07-04 RX ORDER — HYDROCODONE BITARTRATE AND ACETAMINOPHEN 5; 325 MG/1; MG/1
1 TABLET ORAL EVERY 6 HOURS PRN
Qty: 10 TABLET | Refills: 0 | Status: SHIPPED | OUTPATIENT
Start: 2022-07-04 | End: 2022-12-13

## 2022-07-04 RX ORDER — SODIUM CHLORIDE 0.9 % (FLUSH) 0.9 %
10 SYRINGE (ML) INJECTION AS NEEDED
Status: DISCONTINUED | OUTPATIENT
Start: 2022-07-04 | End: 2022-07-04 | Stop reason: HOSPADM

## 2022-07-04 RX ADMIN — IOPAMIDOL 85 ML: 612 INJECTION, SOLUTION INTRAVENOUS at 13:25

## 2022-07-04 NOTE — ED TRIAGE NOTES
Patient to [facility] via car from [facility] for LLQ pain that started on Wed. 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

## 2022-07-04 NOTE — ED PROVIDER NOTES
MD ATTESTATION NOTE    The KJ and I have discussed this patient's history, physical exam, and treatment plan.  I have reviewed the documentation and personally had a face to face interaction with the patient. I affirm the documentation and agree with the treatment and plan.  The attached note describes my personal findings.      I provided a substantive portion of the care of the patient.  I personally performed the physical exam in its entirety, and below are my findings.  For this patient encounter, the patient wore surgical mask, I wore full protective PPE including N95 and eye protection.      Brief HPI: Patient presents emergency department with left lower quadrant pain for about the past week or so.  No fevers or chills.  No black or bloody stools.  No nausea or vomiting.  No prior history of diverticulitis, although he has seen Dr. Wilkes with gastroenterology for colonoscopies in the past.    PHYSICAL EXAM  ED Triage Vitals   Temp Heart Rate Resp BP SpO2   07/04/22 1136 07/04/22 1136 07/04/22 1136 07/04/22 1139 07/04/22 1136   97.9 °F (36.6 °C) 82 16 148/97 95 %      Temp src Heart Rate Source Patient Position BP Location FiO2 (%)   -- -- -- -- --                GENERAL: Awake, alert, no acute distress  HENT: NCAT  EYES: no scleral icterus  CV: regular rhythm, normal rate  RESPIRATORY: normal effort, clear to auscultation bilaterally  ABDOMEN: soft, nondistended, there is mild left lower quadrant tenderness without guarding or rebound.  MUSCULOSKELETAL: no deformites  NEURO: alert, moves all extremities, follows commands  PSYCH:  calm, cooperative  SKIN: warm, dry    Vital signs and nursing notes reviewed.        Plan: Work-up is consistent with acute diverticulitis.  Discussed dietary restrictions, medications, reasons to return to the emergency department and need for follow-up with gastroenterology.  Patient is safe for discharge with close outpatient follow-up.     Mau Jameson MD  07/04/22  [id number]

## 2022-07-04 NOTE — ED PROVIDER NOTES
EMERGENCY DEPARTMENT ENCOUNTER    Room Number:  26/26  Date of encounter:  7/4/2022  PCP: System, Provider Not In  Historian: Patient  Full history not obtainable due to: None    HPI:  Chief Complaint: Abdominal pain    Context: Patel Monreal is a 57 y.o. male who presents to the ED c/o left lower quadrant sharp and nonradiating abdominal pain for the past 1 week.  Symptoms started on Wednesday of last week and have slowly progressed through the weekend.  He admits to chills but denies nausea, vomiting, fever, diarrhea, constipation.  There is been no blood in the stool.  Denies recent abdominal surgeries or antibiotic use.  Has been taking Tylenol with some relief of symptoms.      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      SOCIAL HISTORY  Social History     Socioeconomic History   • Marital status:    Tobacco Use   • Smoking status: Never Smoker   • Smokeless tobacco: Former User     Types: Chew   Vaping Use   • Vaping Use: Never used   Substance and Sexual Activity   • Alcohol use: Yes     Comment: rare   • Drug use: Never         ALLERGIES  Patient has no known allergies.        REVIEW OF SYSTEMS  Review of Systems   Constitutional: Positive for  chills. Negative for fever.   HENT: Negative for congestion.    Eyes: Negative for visual disturbance.   Respiratory: Negative for shortness of breath.    Cardiovascular: Negative for chest pain.   Gastrointestinal: Positive for abdominal pain. Negative for nausea and vomiting.   Genitourinary: Negative for difficulty urinating.   Musculoskeletal: Negative for gait problem.   Skin: Negative for wound.   Neurological: Negative for syncope.   Psychiatric/Behavioral: Negative for suicidal ideas.      All systems reviewed and marked as negative except as listed in HPI       PHYSICAL EXAM    I have reviewed the triage vital signs and nursing notes.    ED Triage Vitals [07/04/22 1136]   Temp Heart Rate Resp BP SpO2   97.9 °F (36.6 °C) 82 16 -- 95 %      Temp src Heart Rate Source Patient Position BP Location FiO2 (%)   -- -- -- -- --       Physical Exam  Constitutional:       General: He is not in acute distress.     Appearance: He is well-developed.   HENT:      Head: Normocephalic and atraumatic.   Eyes:      General: No scleral icterus.     Conjunctiva/sclera: Conjunctivae normal.   Neck:      Trachea: No tracheal deviation.   Cardiovascular:      Rate and Rhythm: Normal rate and regular rhythm.   Pulmonary:      Effort: Pulmonary effort is normal.      Breath sounds: Normal breath sounds.   Abdominal:      Palpations: Abdomen is soft.      Tenderness: There is abdominal tenderness in the left lower quadrant. There is no guarding.   Musculoskeletal:         General: No deformity.      Cervical back: Normal range of motion.   Lymphadenopathy:      Cervical: No cervical adenopathy.   Skin:     General: Skin is warm and dry.   Neurological:      Mental Status: He is alert and oriented to person, place, and time.   Psychiatric:         Behavior: Behavior normal.         Vital signs and nursing notes reviewed.            LAB RESULTS      CBC Auto Differential  Order: [id number] - Part of Panel Order [id number]   Status: Final  result     Visible to patient: Yes (seen)    Specimen Information: Blood         0 Result Notes    Component   Ref Range & Units 2 d ago    WBC   3.40 - 10.80 10*3/mm3 12.38 High     RBC   4.14 - 5.80 10*6/mm3 5.10    Hemoglobin   13.0 - 17.7 g/dL 15.0    Hematocrit   37.5 - 51.0 % 44.0    MCV   79.0 - 97.0 fL 86.3    MCH   26.6 - 33.0 pg 29.4    MCHC   31.5 - 35.7 g/dL 34.1    RDW   12.3 - 15.4 % 12.6    RDW-SD   37.0 - 54.0 fl 38.8    MPV   6.0 - 12.0 fL 9.4    Platelets   140 - 450 10*3/mm3 221    Neutrophil %   42.7 - 76.0 % 77.0 High     Lymphocyte %   19.6 - 45.3 % 12.5 Low     Monocyte %   5.0 - 12.0 % 8.8    Eosinophil %   0.3 - 6.2 % 0.6    Basophil %   0.0 - 1.5 % 0.5    Immature Grans %   0.0 - 0.5 % 0.6 High     Neutrophils, Absolute   1.70 - 7.00 10*3/mm3 9.53 High     Lymphocytes, Absolute   0.70 - 3.10 10*3/mm3 1.55    Monocytes, Absolute   0.10 - 0.90 10*3/mm3 1.09 High     Eosinophils, Absolute   0.00 - 0.40 10*3/mm3 0.08    Basophils, Absolute   0.00 - 0.20 10*3/mm3 0.06    Immature Grans, Absolute   0.00 - 0.05 10*3/mm3 0.07 High     nRBC   0.0 - 0.2 /100 WBC 0.0    Resulting Agency [facility]              Specimen Collected: 07/04/22 12:14 Last Resulted: 07/04/22 12:28                 RADIOLOGY  No Radiology Exams Resulted Within Past 24 Hours     CT ABDOMEN AND PELVIS WITH IV CONTRAST     HISTORY: Left lower quadrant abdominal pain.     TECHNIQUE:  CT includes axial imaging from the lung bases to the  trochanters with intravenous contrast and without use of oral contrast.  Data reconstructed in coronal and sagittal planes. Radiation dose  reduction techniques were utilized, including automated exposure control  and exposure modulation based on body size.     COMPARISON: None.     FINDINGS: Lung bases, spleen, gallbladder, pancreas appear within normal  limits. There is a 2 cm cyst within the inferior right lobe of the  liver. There is a small hiatal hernia and there is mild edema and  fluid  density within the posterior aspect of the fat extending adjacent to the  hiatal hernia. Gallbladder appears normal. Kidneys are within normal  limits with the exception of bilateral perinephric stranding which is  most likely chronic. There is no hydronephrosis.     There is abnormal wall thickening and pericolonic stranding involving a  5 cm in length segment of the junction of the descending and sigmoid  colon. Pericolonic stranding/inflammation is present and these findings  are most likely due to acute diverticulitis. Several colonic diverticula  are present greatest involving the sigmoid colon. There is no evidence  for abscess or free intraperitoneal air. There is mild fluid density  extending along the left paracolic gutter.     IMPRESSION:  1. Acute diverticulitis involving the left colon at the junction of the  descending and sigmoid colon. There is a 5 cm segment of colonic wall  thickening attributed to diverticulitis. There can be overlap in the  imaging appearance of diverticulitis and wall thickening related to  colonic malignancy and recommend follow-up CT or correlation with  screening colonoscopy.  2. 2 cm cyst inferior right lobe of the liver.  3. Small hiatal hernia.     Discussed with Dr. Jameson in the emergency department 07/04/2022 at  1:40 PM.     Radiation dose reduction techniques were utilized, including automated  exposure control and exposure modulation based on body size.     This report was finalized on 7/4/2022 2:29 PM by Dr. Gucci Pal M.D.    I ordered the above noted radiological studies. Independently reviewed by me and discussed with radiologist.  See dictation above for official radiology interpretation.      PROCEDURES    Procedures        MEDICATIONS GIVEN IN ER    Medications   sodium chloride 0.9 % flush 10 mL (has no administration in time range)         PROGRESS, DATA ANALYSIS, CONSULTS, AND MEDICAL DECISION MAKING    All labs have been independently  reviewed by me.  All radiology studies have been reviewed by me.   EKG's independently reviewed by me.  Discussion below represents my analysis of pertinent findings related to patient's condition, differential diagnosis, treatment plan and final disposition.    DIFFERENTIAL DIAGNOSIS INCLUDE BUT NOT LIMITED TO:     Differential diagnosis includes but is not limited to:  - Hepatobiliary pathology such as cholecystitis, cholangitis, and symptomatic cholelithiasis  - Pancreatitis  - Dyspepsia  - Small bowel obstruction  - Appendicitis  - Diverticulitis  - UTI including pyelonephritis  - Ureteral stone  - Zoster  - Colitis, including infectious and ischemic  - Atypical ACS      ED Course as of 07/06/22 0953   Mon Jul 04, 2022   1230 WBC(!): 12.38 [DC]      ED Course User Index  [DC] Rogelio Hidalgo PA       AS OF 11:56 EDT VITALS:    BP -    HR - 82  TEMP - 97.9 °F (36.6 °C)  02 SATS - 95%        DIAGNOSIS  Final diagnoses:   Diverticulitis             Pt masked in first look. I wore a surgical mask throughout my encounters with the pt. I performed hand hygiene on entry into the pt room and upon exit.     Much of this encounter note is an electronic transcription of spoken language to printed text.  As such, the subtleties and finesse of spoken language may permit erroneous, or at times, nonsensical words or phrases to be inadvertently transcribed; Although I have reviewed the note for such errors, some may still exist.      Rogelio Hidalgo PA  07/06/22 0955

## 2022-07-11 ENCOUNTER — OFFICE VISIT (OUTPATIENT)
Dept: VASCULAR SURGERY | Facility: HOSPITAL | Age: 57
End: 2022-07-11

## 2022-07-11 VITALS
SYSTOLIC BLOOD PRESSURE: 122 MMHG | RESPIRATION RATE: 18 BRPM | DIASTOLIC BLOOD PRESSURE: 86 MMHG | WEIGHT: 217 LBS | HEART RATE: 80 BPM | BODY MASS INDEX: 30.38 KG/M2 | TEMPERATURE: 97.8 F | OXYGEN SATURATION: 98 % | HEIGHT: 71 IN

## 2022-07-11 DIAGNOSIS — I73.89: Primary | ICD-10-CM

## 2022-07-11 PROCEDURE — G0463 HOSPITAL OUTPT CLINIC VISIT: HCPCS | Performed by: SURGERY

## 2022-07-11 PROCEDURE — 99203 OFFICE O/P NEW LOW 30 MIN: CPT | Performed by: SURGERY

## 2022-07-11 NOTE — PROGRESS NOTES
[facility]   HISTORY AND PHYSICAL    Patient Name: Patel Monreal  : 1965  MRN: [id number]  Primary Care Physician:  System, Provider Not In  Date of admission: (Not on file)    Subjective   Subjective     Chief Complaint: Abnormal MRI of the left popliteal artery    HPI:    Patel Monreal is a 57 y.o. male who was undergoing evaluation for left knee pain by orthopedic surgery.  As part of the evaluation an MRI was obtained which revealed findings suggestive of adventitial cystic disease of the left popliteal artery.  At this time he comes to see me for further evaluation from the vascular standpoint.  He denies any symptoms to suggest intermittent claudication.  He can walk half a mile without any problems.  Every once in while, maybe every few months, he may have a little bit of cramping of the left calf.  More often than that sometimes he may have some tightness of the right posterior thigh.    Review of Systems    Non contributory except for the History of Present Illness    Personal History     Past Medical History:   Diagnosis Date   • Renal disorder        Past Surgical History:   Procedure Laterality Date   • COLONOSCOPY     • KNEE ARTHROSCOPY Bilateral    • SHOULDER ARTHROSCOPY Right    • TONSILLECTOMY     • TRIGGER FINGER RELEASE Right        Family History: family history is not on file. Otherwise pertinent FHx was reviewed and not pertinent to current issue.    Social History:  reports that he has never smoked. He has quit using smokeless tobacco.  His smokeless tobacco use included chew. He reports current alcohol use. He reports that he does not use drugs.    Home Medications:  Current Outpatient Medications on File Prior to Visit   Medication Sig   • alfuzosin (UROXATRAL) 10 MG 24 hr tablet Take 10 mg by mouth Daily. as directed   • amoxicillin-clavulanate (AUGMENTIN) 875-125 MG per tablet Take 1 tablet by mouth Every 12 (Twelve) Hours.   • aspirin 81 MG EC tablet    • HYDROcodone-acetaminophen  (NORCO) 5-325 MG per tablet Take 1 tablet by mouth Every 6 (Six) Hours As Needed for Moderate Pain  or Severe Pain .     No current facility-administered medications on file prior to visit.          Allergies:  No Known Allergies    Objective   Objective     Vitals:   Temp:  [97.8 °F (36.6 °C)] 97.8 °F (36.6 °C)  Heart Rate:  [80] 80  Resp:  [18] 18  BP: (122)/(86) 122/86    Physical Exam   General: Alert, no acute distress.  Neck: Supple  Heart: Regular rate  Lungs: Clear  Abdomen: Benign  Extremities: Symmetric  Pulses: +2 bilateral popliteal and pedal pulses.  Normal masses easily identifiable on examination.    Diagnostic studies:   An MRI of the left knee dated 5/24/2022 demonstrates a 2.0 x 1.0 cystic focus along the anterior aspect of the popliteal artery which is suspected to represent cystic adventitial disease.    Assessment & Plan   Assessment / Plan     Active Hospital Problems:  There are no active hospital problems to display for this patient.      Diagnoses and all orders for this visit:    1. 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 Monreal has an abnormal MRI which suggests the possibility of popliteal adventitial cystic disease of the left knee.  He has no symptoms however of compression of the artery.  He has no claudication.  No findings on examination of concern at this time.  I had a long discussion with him regarding the significance of the findings.  At this time the plan is to do a follow-up study in 6 months.  We will obtain a CTA at that time which offers better arterial imaging.  If he remains asymptomatic and there are no concerning findings on imaging would consider a periodic duplex for a period of time.  Follow-up with me after the CTA      Electronically signed by Aguilar Alva MD, 07/11/22, 2:54 PM EDT.

## 2022-07-21 ENCOUNTER — OFFICE VISIT (OUTPATIENT)
Dept: GASTROENTEROLOGY | Facility: CLINIC | Age: 57
End: 2022-07-21

## 2022-07-21 VITALS
DIASTOLIC BLOOD PRESSURE: 83 MMHG | SYSTOLIC BLOOD PRESSURE: 129 MMHG | HEART RATE: 71 BPM | HEIGHT: 71 IN | BODY MASS INDEX: 30.8 KG/M2 | WEIGHT: 220 LBS

## 2022-07-21 DIAGNOSIS — K57.92 DIVERTICULITIS: Primary | ICD-10-CM

## 2022-07-21 DIAGNOSIS — Z86.010 HISTORY OF COLON POLYPS: ICD-10-CM

## 2022-07-21 PROBLEM — Z86.0100 HISTORY OF COLON POLYPS: Status: ACTIVE | Noted: 2022-07-21

## 2022-07-21 PROCEDURE — 99214 OFFICE O/P EST MOD 30 MIN: CPT | Performed by: NURSE PRACTITIONER

## 2022-07-21 RX ORDER — SOD SULF/POT CHLORIDE/MAG SULF 1.479 G
12 TABLET ORAL TAKE AS DIRECTED
Qty: 24 TABLET | Refills: 0 | Status: ON HOLD | OUTPATIENT
Start: 2022-07-21 | End: 2022-12-16

## 2022-07-21 NOTE — PROGRESS NOTES
Patient Name: Patel Monreal   Visit Date: 07/21/2022   Patient ID: [id number]  Provider: MANDEEP Maldonado    Sex: male  Location:  Location Address:  Location Phone: [id number]  [place] [address] [phone]    YOB: 1965      Primary Care Provider System, Provider Not In      Referring Provider: No ref. provider found        Chief Complaint  Diverticulitis and Abdominal Cramping (Not current )    History of Present Illness  Patel Monreal is a 57 y.o. who presents to [facility] GASTROENTEROLOGY on referral from No ref. provider found for a gastroenterology evaluation of Diverticulitis and Abdominal Cramping (Not current ).    Mr. Monreal presents today for evaluation of diverticulitis.  Recalls traveling to [place] at the end of June and snacking on a large bag of trail mix.  This was on a Monday morning and Wednesday he began to have intense stomach cramping that shortly turned into left lower quadrant pain.  Several days after when he got home he went to [facility] ER and was treated for diverticulitis with antibiotics.  Today he reports he is no longer having any pain.  Appetite and weight stable.    Bowel movement daily to every other day. Describes stools to be a #4 on the bristol stool chart. Denies any hematochezia or melena.  Last colonoscopy was approximately 4 years ago at [facility], requesting records.  Reports a history of numerous colon polyps.     Labs Result Review Imaging    Past Medical History:   Diagnosis Date   • Renal disorder        Past Surgical History:   Procedure Laterality Date   • COLONOSCOPY     • KNEE ARTHROSCOPY Bilateral    • SHOULDER ARTHROSCOPY Right    • TONSILLECTOMY     • TRIGGER FINGER RELEASE Right          Current Outpatient Medications:   •  alfuzosin (UROXATRAL) 10 MG 24 hr tablet, Take 10 mg by mouth Daily. as directed, Disp: , Rfl:   •  aspirin 81 MG EC tablet, , Disp: , Rfl:   •  amoxicillin-clavulanate (AUGMENTIN)  "875-125 MG per tablet, Take 1 tablet by mouth Every 12 (Twelve) Hours., Disp: 20 tablet, Rfl: 0  •  HYDROcodone-acetaminophen (NORCO) 5-325 MG per tablet, Take 1 tablet by mouth Every 6 (Six) Hours As Needed for Moderate Pain  or Severe Pain ., Disp: 10 tablet, Rfl: 0  •  Sodium Sulfate-Mag Sulfate-KCl (Sutab) 3052-832-802 MG tablet, Take 12 tablets by mouth Take As Directed. Take 12 tablets at 6 pm and 12 tablets 4 hours prior to procedure., Disp: 24 tablet, Rfl: 0     No Known Allergies    Family History   Problem Relation Age of Onset   • Colon cancer Father         Social History     Social History Narrative   • Not on file         Objective     Review of Systems   Constitutional: Negative for appetite change and unexpected weight loss.   Gastrointestinal: Negative for abdominal pain, anal bleeding, blood in stool, constipation and diarrhea.        Vital Signs:   /83 (BP Location: Right arm, Patient Position: Sitting, Cuff Size: Large Adult)   Pulse 71   Ht 180.3 cm (71\")   Wt 99.8 kg (220 lb)   BMI 30.68 kg/m²       Physical Exam  Constitutional:       General: He is not in acute distress.     Appearance: Normal appearance. He is well-developed and normal weight.   HENT:      Head: Normocephalic and atraumatic.   Eyes:      Conjunctiva/sclera: Conjunctivae normal.      Pupils: Pupils are equal, round, and reactive to light.      Visual Fields: Right eye visual fields normal and left eye visual fields normal.   Cardiovascular:      Rate and Rhythm: Normal rate and regular rhythm.      Heart sounds: Normal heart sounds.   Pulmonary:      Effort: Pulmonary effort is normal. No retractions.      Breath sounds: Normal breath sounds and air entry.   Abdominal:      General: Bowel sounds are normal. There is no distension.      Palpations: Abdomen is soft.      Tenderness: There is no abdominal tenderness.      Comments: No appreciable hepatosplenomegaly or ascites   Musculoskeletal:         General: Normal " range of motion.      Cervical back: Normal range of motion and neck supple.   Skin:     General: Skin is warm and dry.   Neurological:      Mental Status: He is alert and oriented to person, place, and time.   Psychiatric:         Mood and Affect: Mood and affect normal.         Behavior: Behavior normal.         Result Review :   The following data was reviewed by: MANDEEP Maldonado on 07/21/2022:  CBC w/diff    CBC w/Diff 7/4/22   WBC 12.38 (A)   RBC 5.10   Hemoglobin 15.0   Hematocrit 44.0   MCV 86.3   MCH 29.4   MCHC 34.1   RDW 12.6   Platelets 221   Neutrophil Rel % 77.0 (A)   Immature Granulocyte Rel % 0.6 (A)   Lymphocyte Rel % 12.5 (A)   Monocyte Rel % 8.8   Eosinophil Rel % 0.6   Basophil Rel % 0.5   (A) Abnormal value            CMP    CMP 7/4/22   Glucose 101 (A)   BUN 13   Creatinine 0.96   Sodium 140   Potassium 4.0   Chloride 105   Calcium 9.6   Albumin 4.30   Total Bilirubin 0.8   Alkaline Phosphatase 69   AST (SGOT) 28   ALT (SGPT) 45 (A)   (A) Abnormal value            Lipase   Date Value Ref Range Status   07/04/2022 36 13 - 60 U/L Final        CT of the abdomen and pelvis with contrast 7/4/2022: Acute diverticulitis involving left colon at the junction of the descending and sigmoid colon.  5 cm segment of colonic wall thickening attributed to diverticulitis.  2 cm cyst inferior right lobe of liver.  Small hiatal hernia.     Assessment and Plan    Diagnoses and all orders for this visit:    1. Diverticulitis (Primary)  -     Case Request; Standing  -     Case Request    2. History of colon polyps  -     Case Request; Standing  -     Case Request    Other orders  -     Sodium Sulfate-Mag Sulfate-KCl (Sutab) 1294-819-995 MG tablet; Take 12 tablets by mouth Take As Directed. Take 12 tablets at 6 pm and 12 tablets 4 hours prior to procedure.  Dispense: 24 tablet; Refill: 0  -     Follow Anesthesia Guidelines / Protocol; Future  -     Obtain Informed Consent; Future      COLONOSCOPY (N/A)        Follow Up   Return for Follow up after procedure.  Patient was given instructions and counseling regarding his condition or for health maintenance advice. Please see specific information pulled into the AVS if appropriate.

## 2022-08-02 ENCOUNTER — OFFICE VISIT (OUTPATIENT)
Dept: ORTHOPEDIC SURGERY | Facility: CLINIC | Age: 57
End: 2022-08-02

## 2022-08-02 VITALS — WEIGHT: 220 LBS | BODY MASS INDEX: 30.8 KG/M2 | HEIGHT: 71 IN

## 2022-08-02 DIAGNOSIS — S83.242A ACUTE MEDIAL MENISCUS TEAR OF LEFT KNEE, INITIAL ENCOUNTER: ICD-10-CM

## 2022-08-02 DIAGNOSIS — M71.22 BAKER CYST, LEFT: ICD-10-CM

## 2022-08-02 DIAGNOSIS — M25.562 LEFT KNEE PAIN, UNSPECIFIED CHRONICITY: ICD-10-CM

## 2022-08-02 DIAGNOSIS — S83.242S ACUTE MEDIAL MENISCUS TEAR OF LEFT KNEE, SEQUELA: ICD-10-CM

## 2022-08-02 DIAGNOSIS — I73.89: ICD-10-CM

## 2022-08-02 DIAGNOSIS — M17.12 PRIMARY OSTEOARTHRITIS OF LEFT KNEE: Primary | ICD-10-CM

## 2022-08-02 PROCEDURE — 99213 OFFICE O/P EST LOW 20 MIN: CPT | Performed by: ORTHOPAEDIC SURGERY

## 2022-08-02 NOTE — PROGRESS NOTES
"Chief Complaint  Pain and Follow-up of the Left Knee     Subjective      Patel Monreal presents to [facility] for follow up evaluation of the left knee. The patient has been treating his left knee pain conservatively. He had an injection 6 weeks ago. He seen Vascular and is scheduled for a scan in December. He got relief with the injection. He is still getting relief.     No Known Allergies     Social History     Socioeconomic History   • Marital status:    Tobacco Use   • Smoking status: Never Smoker   • Smokeless tobacco: Former User     Types: Chew   Vaping Use   • Vaping Use: Never used   Substance and Sexual Activity   • Alcohol use: Not Currently     Comment: rare   • Drug use: Never   • Sexual activity: Defer        Review of Systems     Objective   Vital Signs:   Ht 180.3 cm (71\")   Wt 99.8 kg (220 lb)   BMI 30.68 kg/m²       Physical Exam  Constitutional:       Appearance: Normal appearance. The patient is well-developed and normal weight.   HENT:      Head: Normocephalic.      Right Ear: Hearing and external ear normal.      Left Ear: Hearing and external ear normal.      Nose: Nose normal.   Eyes:      Conjunctiva/sclera: Conjunctivae normal.   Cardiovascular:      Rate and Rhythm: Normal rate.   Pulmonary:      Effort: Pulmonary effort is normal.      Breath sounds: No wheezing or rales.   Abdominal:      Palpations: Abdomen is soft.      Tenderness: There is no abdominal tenderness.   Musculoskeletal:      Cervical back: Normal range of motion.   Skin:     Findings: No rash.   Neurological:      Mental Status: The patient is alert and oriented to person, place, and time.   Psychiatric:         Mood and Affect: Mood and affect normal.         Judgment: Judgment normal.       Ortho Exam      Left knee- ROM 0-120 degrees. Stable to varus/valgus stress. Stable to anterior/posterior drawer. Non-tender. No swelling. Neurovascularly intact. Positive EHL, FHL, GS and TA. " Sensation intact to all 5 nerves of the foot. Positive pulses.     Procedures      Imaging Results (Most Recent)     None           Result Review :       CT Abdomen Pelvis With Contrast    Result Date: 7/4/2022  Narrative: CT ABDOMEN AND PELVIS WITH IV CONTRAST  HISTORY: Left lower quadrant abdominal pain.  TECHNIQUE:  CT includes axial imaging from the lung bases to the trochanters with intravenous contrast and without use of oral contrast. Data reconstructed in coronal and sagittal planes. Radiation dose reduction techniques were utilized, including automated exposure control and exposure modulation based on body size.  COMPARISON: None.  FINDINGS: Lung bases, spleen, gallbladder, pancreas appear within normal limits. There is a 2 cm cyst within the inferior right lobe of the liver. There is a small hiatal hernia and there is mild edema and fluid density within the posterior aspect of the fat extending adjacent to the hiatal hernia. Gallbladder appears normal. Kidneys are within normal limits with the exception of bilateral perinephric stranding which is most likely chronic. There is no hydronephrosis.  There is abnormal wall thickening and pericolonic stranding involving a 5 cm in length segment of the junction of the descending and sigmoid colon. Pericolonic stranding/inflammation is present and these findings are most likely due to acute diverticulitis. Several colonic diverticula are present greatest involving the sigmoid colon. There is no evidence for abscess or free intraperitoneal air. There is mild fluid density extending along the left paracolic gutter.      Impression: 1. Acute diverticulitis involving the left colon at the junction of the descending and sigmoid colon. There is a 5 cm segment of colonic wall thickening attributed to diverticulitis. There can be overlap in the imaging appearance of diverticulitis and wall thickening related to colonic malignancy and recommend follow-up CT or correlation  with screening colonoscopy. 2. 2 cm cyst inferior right lobe of the liver. 3. Small hiatal hernia.  Discussed with Dr. Jameson in the emergency department 07/04/2022 at 1:40 PM.  Radiation dose reduction techniques were utilized, including automated exposure control and exposure modulation based on body size.  This report was finalized on 7/4/2022 2:29 PM by Dr. Gucci Pal M.D.               Assessment and Plan     Diagnoses and all orders for this visit:    1. Primary osteoarthritis of left knee (Primary)    2. Baker cyst, left    3. Acute medial meniscus tear of left knee, sequela    4. 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 Left knee pain, unspecified chronicity    6. Acute medial meniscus tear of left knee, initial encounter        Discussed the treatment plan with the patient.  Plan to continue conservative treatment. He can have repeat injections as needed every 3 months.     Call or return if worsening symptoms.    Follow Up     PRN      Patient was given instructions and counseling regarding his condition or for health maintenance advice. Please see specific information pulled into the AVS if appropriate.     Scribed for Major Hickman MD by Lucia Peters.  08/02/22   16:29 EDT    I have personally performed the services described in this document as scribed by the above individual and it is both accurate and complete. Major Hickman MD 08/02/22

## 2022-10-27 ENCOUNTER — OFFICE VISIT (OUTPATIENT)
Dept: ORTHOPEDIC SURGERY | Facility: CLINIC | Age: 57
End: 2022-10-27

## 2022-10-27 VITALS — WEIGHT: 225 LBS | OXYGEN SATURATION: 98 % | BODY MASS INDEX: 31.5 KG/M2 | HEIGHT: 71 IN | HEART RATE: 72 BPM

## 2022-10-27 DIAGNOSIS — M25.512 LEFT SHOULDER PAIN, UNSPECIFIED CHRONICITY: Primary | ICD-10-CM

## 2022-10-27 DIAGNOSIS — S46.002A INJURY OF LEFT ROTATOR CUFF, INITIAL ENCOUNTER: ICD-10-CM

## 2022-10-27 PROCEDURE — 99213 OFFICE O/P EST LOW 20 MIN: CPT | Performed by: ORTHOPAEDIC SURGERY

## 2022-10-27 NOTE — PROGRESS NOTES
"Chief Complaint  Pain and Initial Evaluation of the Left Shoulder     Subjective      Patel Monreal presents to [facility] for evaluation of the left shoulder. The patient reports about 2 months ago he was lifting his lawnmower into the bed of his truck and felt a pop in his shoulder. He locates pain to the anterior superior shoulder. He denies any bruising. He reports pain at night. He reports pain with lifting and decreased strength. He has taken ibuprofen.     No Known Allergies     Social History     Socioeconomic History   • Marital status:    Tobacco Use   • Smoking status: Never   • Smokeless tobacco: Former     Types: Chew   Vaping Use   • Vaping Use: Never used   Substance and Sexual Activity   • Alcohol use: Not Currently     Comment: rare   • Drug use: Never   • Sexual activity: Defer        Review of Systems     Objective   Vital Signs:   Pulse 72   Ht 180.3 cm (71\")   Wt 102 kg (225 lb)   SpO2 98%   BMI 31.38 kg/m²       Physical Exam  Constitutional:       Appearance: Normal appearance. The patient is well-developed and normal weight.   HENT:      Head: Normocephalic.      Right Ear: Hearing and external ear normal.      Left Ear: Hearing and external ear normal.      Nose: Nose normal.   Eyes:      Conjunctiva/sclera: Conjunctivae normal.   Cardiovascular:      Rate and Rhythm: Normal rate.   Pulmonary:      Effort: Pulmonary effort is normal.      Breath sounds: No wheezing or rales.   Abdominal:      Palpations: Abdomen is soft.      Tenderness: There is no abdominal tenderness.   Musculoskeletal:      Cervical back: Normal range of motion.   Skin:     Findings: No rash.   Neurological:      Mental Status: The patient is alert and oriented to person, place, and time.   Psychiatric:         Mood and Affect: Mood and affect normal.         Judgment: Judgment normal.       Ortho Exam      Left shoulder- non-tender. Neurovascularly intact. Forward elevation 175 " with pain. Abduction 160. External Rotation 70. Internal rotation 50. Sensation to light touch median, radial, ulnar nerve. Positive AIN, PIN, ulnar nerve motor function. 4+ supraspinatus strength, 5/5 infraspinatus and subscapularis. Internal rotation to L-4. Positive impingement signs. Pain with cross arm adduction. Positive O'jay.     Procedures    X-Ray Report:  Left scapula(s) X-Ray  Indication: Evaluation of left shoulder pain  AP/Lateral view(s)  Findings: no acute fracture. 1mm subchondral cyst to the superior humeral head and greater tuberosity. Downward sloping acromion. Mild to moderate degenerative changes to the acromioclavicular joint   Prior studies available for comparison: no         Imaging Results (Most Recent)     Procedure Component Value Units Date/Time    XR Scapula Left [[id number]] Resulted: 10/27/22 1016     Updated: 10/27/22 1017           Result Review :       No results found.           Assessment and Plan     Diagnoses and all orders for this visit:    1. Left shoulder pain, unspecified chronicity (Primary)  -     XR Scapula Left    2. Injury of left rotator cuff, initial encounter        Discussed the treatment plan with the patient.  I reviewed the x-rays that were obtained today with the patient. Plan for MRI of the left shoulder to evaluate the rotator cuff and labrum. The patient expressed understanding and wished to proceed.     Call or return if worsening symptoms.    Follow Up     MRI results.       Patient was given instructions and counseling regarding his condition or for health maintenance advice. Please see specific information pulled into the AVS if appropriate.     Scribed for Major Hickman MD by Lucia Peters.  10/27/22   10:24 EDT    I have personally performed the services described in this document as scribed by the above individual and it is both accurate and complete. Major Hickman MD 10/28/22

## 2022-11-11 ENCOUNTER — HOSPITAL ENCOUNTER (OUTPATIENT)
Dept: MRI IMAGING | Facility: HOSPITAL | Age: 57
Discharge: HOME | End: 2022-11-11
Admitting: ORTHOPAEDIC SURGERY

## 2022-11-11 DIAGNOSIS — S46.002A INJURY OF LEFT ROTATOR CUFF, INITIAL ENCOUNTER: ICD-10-CM

## 2022-11-11 DIAGNOSIS — M25.512 LEFT SHOULDER PAIN, UNSPECIFIED CHRONICITY: ICD-10-CM

## 2022-11-11 PROCEDURE — 73221 MRI JOINT UPR EXTREM W/O DYE: CPT

## 2022-11-15 ENCOUNTER — OFFICE VISIT (OUTPATIENT)
Dept: ORTHOPEDIC SURGERY | Facility: CLINIC | Age: 57
End: 2022-11-15

## 2022-11-15 VITALS — BODY MASS INDEX: 31.5 KG/M2 | WEIGHT: 225 LBS | OXYGEN SATURATION: 97 % | HEIGHT: 71 IN | HEART RATE: 79 BPM

## 2022-11-15 DIAGNOSIS — M24.112 LABRAL TEAR OF SHOULDER, DEGENERATIVE, LEFT: ICD-10-CM

## 2022-11-15 DIAGNOSIS — M75.22 BICEPS TENDONITIS, LEFT: ICD-10-CM

## 2022-11-15 DIAGNOSIS — M19.012 PRIMARY OSTEOARTHRITIS OF LEFT SHOULDER: ICD-10-CM

## 2022-11-15 DIAGNOSIS — M75.82 ROTATOR CUFF TENDONITIS, LEFT: Primary | ICD-10-CM

## 2022-11-15 DIAGNOSIS — M75.112 INCOMPLETE TEAR OF LEFT ROTATOR CUFF, UNSPECIFIED WHETHER TRAUMATIC: ICD-10-CM

## 2022-11-15 DIAGNOSIS — M19.019 OSTEOARTHRITIS OF AC (ACROMIOCLAVICULAR) JOINT: ICD-10-CM

## 2022-11-15 PROCEDURE — 20610 DRAIN/INJ JOINT/BURSA W/O US: CPT | Performed by: ORTHOPAEDIC SURGERY

## 2022-11-15 PROCEDURE — 99213 OFFICE O/P EST LOW 20 MIN: CPT | Performed by: ORTHOPAEDIC SURGERY

## 2022-11-15 RX ORDER — LIDOCAINE HYDROCHLORIDE 10 MG/ML
5 INJECTION, SOLUTION INFILTRATION; PERINEURAL
Status: COMPLETED | OUTPATIENT
Start: 2022-11-15 | End: 2022-11-15

## 2022-11-15 RX ORDER — TRIAMCINOLONE ACETONIDE 40 MG/ML
40 INJECTION, SUSPENSION INTRA-ARTICULAR; INTRAMUSCULAR
Status: COMPLETED | OUTPATIENT
Start: 2022-11-15 | End: 2022-11-15

## 2022-11-15 RX ADMIN — TRIAMCINOLONE ACETONIDE 40 MG: 40 INJECTION, SUSPENSION INTRA-ARTICULAR; INTRAMUSCULAR at 16:44

## 2022-11-15 RX ADMIN — LIDOCAINE HYDROCHLORIDE 5 ML: 10 INJECTION, SOLUTION INFILTRATION; PERINEURAL at 16:44

## 2022-11-15 NOTE — PROGRESS NOTES
"Chief Complaint  Pain and Follow-up of the Left Shoulder     Subjective      Patel Monreal presents to [facility] for follow up evaluation of the left shoulder. The patient recently had an MRI and is here today for those results. To review, The patient reports about 2 months ago he was lifting his lawnmower into the bed of his truck and felt a pop in his shoulder. He locates pain to the anterior superior shoulder. He denies any bruising. He reports pain at night. He reports pain with lifting and decreased strength. He has taken ibuprofen.     No Known Allergies     Social History     Socioeconomic History   • Marital status:    Tobacco Use   • Smoking status: Never   • Smokeless tobacco: Former     Types: Chew   Vaping Use   • Vaping Use: Never used   Substance and Sexual Activity   • Alcohol use: Not Currently     Comment: rare   • Drug use: Never   • Sexual activity: Defer        Review of Systems     Objective   Vital Signs:   Pulse 79   Ht 180.3 cm (71\")   Wt 102 kg (225 lb)   SpO2 97%   BMI 31.38 kg/m²       Physical Exam  Constitutional:       Appearance: Normal appearance. The patient is well-developed and normal weight.   HENT:      Head: Normocephalic.      Right Ear: Hearing and external ear normal.      Left Ear: Hearing and external ear normal.      Nose: Nose normal.   Eyes:      Conjunctiva/sclera: Conjunctivae normal.   Cardiovascular:      Rate and Rhythm: Normal rate.   Pulmonary:      Effort: Pulmonary effort is normal.      Breath sounds: No wheezing or rales.   Abdominal:      Palpations: Abdomen is soft.      Tenderness: There is no abdominal tenderness.   Musculoskeletal:      Cervical back: Normal range of motion.   Skin:     Findings: No rash.   Neurological:      Mental Status: The patient is alert and oriented to person, place, and time.   Psychiatric:         Mood and Affect: Mood and affect normal.         Judgment: Judgment normal.       Ortho Exam  "     Left shoulder- non-tender. Neurovascularly intact. Forward elevation 175 with pain. Abduction 160. External Rotation 70. Internal rotation 50. Sensation to light touch median, radial, ulnar nerve. Positive AIN, PIN, ulnar nerve motor function. 4+ supraspinatus strength, 5/5 infraspinatus and subscapularis. Internal rotation to L-4. Positive impingement signs. Pain with cross arm adduction. Positive O'jay.     Left shoulder injection  Date/Time: 11/15/2022 4:44 PM  Consent given by: patient  Site marked: site marked  Timeout: Immediately prior to procedure a time out was called to verify the correct patient, procedure, equipment, support staff and site/side marked as required   Supporting Documentation  Indications: pain   Procedure Details  Location: shoulder - Shoulder joint: left.  Needle gauge: 21g.  Medications administered: 5 mL lidocaine 1 %; 40 mg triamcinolone acetonide 40 MG/ML  Patient tolerance: patient tolerated the procedure well with no immediate complications          Imaging Results (Most Recent)     None           Result Review :       XR Scapula Left    Result Date: 10/27/2022  Narrative: X-Ray Report: Left scapula(s) X-Ray Indication: Evaluation of left shoulder pain AP/Lateral view(s) Findings: no acute fracture. 1mm subchondral cyst to the superior humeral head and greater tuberosity. Downward sloping acromion. Mild to moderate degenerative changes to the acromioclavicular joint Prior studies available for comparison: no     MRI Shoulder Left Without Contrast    Result Date: 11/14/2022  Narrative: PROCEDURE: MRI SHOULDER LEFT WO CONTRAST  COMPARISON: None  INDICATIONS: Left shoulder injury      TECHNIQUE: A variety of imaging planes and parameters were utilized for visualization of suspected pathology.  Images were performed without contrast.   FINDINGS:  Rotator cuff tendinosis is present with intermediate signal changes and thickening.  There is mild partial articular sided tearing of  the supraspinatus, infraspinatus and superior fibers of the subscapularis tendons at the footplate.  There is no evidence of complete tear.  The biceps tendon appears intact.  Fluid is seen along the extracapsular portion of the biceps tendon.  Mild to moderate acromioclavicular and glenohumeral osteoarthritic changes are present.  Mild pan labral degenerative tearing is present.  No evidence of a focal displaced tear.  No significant effusion identified.  No abnormal focal bone marrow signal is seen. The cortical margins are intact. The volume of the rotator cuff musculature is within normal limits. The surrounding soft tissues are unremarkable.      Impression:   1. Rotator cuff tendinosis is present with mild partial articular sided tearing of the supraspinatus, infraspinatus and subscapularis tendons.  No evidence of complete tear. 2. Biceps tenosynovitis. 3. Mild to moderate acromioclavicular and glenohumeral osteoarthritis. 4. Mild pan labral degenerative tearing with no evidence of a focal displaced tear..     CIRO KOENIG MD       Electronically Signed and Approved By: CIRO KOENIG MD on 11/14/2022 at 12:57                      Assessment and Plan     Diagnoses and all orders for this visit:    1. Rotator cuff tendonitis, left (Primary)    2. Incomplete tear of left rotator cuff, unspecified whether traumatic    3. Biceps tendonitis, left    4. Osteoarthritis of AC (acromioclavicular) joint    5. Primary osteoarthritis of left shoulder    6. Labral tear of shoulder, degenerative, left        Discussed the treatment plan with the patient.  Plan for conservative treatment at this time. Discussed the risks and benefits of a left shoulder steroid injection. The patient expressed understanding and wished to proceed. He tolerated the injection well. Order for physical/occupational therapy given today. Plan for OTC NSAIDS at this time.     Educated on risk of smoking. Discussed options for smoking cessation.   Call  or return if worsening symptoms.    Follow Up     6 weeks      Patient was given instructions and counseling regarding his condition or for health maintenance advice. Please see specific information pulled into the AVS if appropriate.     Scribed for Major Hickman MD by Lucia Peters.  11/15/22   16:28 EST    I have personally performed the services described in this document as scribed by the above individual and it is both accurate and complete. Major Hickman MD 11/15/22

## 2022-11-21 ENCOUNTER — TELEPHONE (OUTPATIENT)
Dept: ORTHOPEDIC SURGERY | Facility: CLINIC | Age: 57
End: 2022-11-21

## 2022-11-21 NOTE — TELEPHONE ENCOUNTER
Caller: LYNN GOOD    Relationship: PATIENT     Best call back number: [phone]    What orders are you requesting (i.e. lab or imaging): PT        Where will you receive your lab/imaging services: PT,  [facility]ING [facility]   [facility] [place] [address] [place]  [address]  PHONE [phone]  Additional notes: CALLED BY , WOULD LIKE TO GO TO [place] PLEASE

## 2022-11-21 NOTE — TELEPHONE ENCOUNTER
UNABLE TO WT     Caller: LYNN GOOD     Relationship to patient: PATIENT     Best call back [phone]    Patient is needing: STATUS OF FMLA  DID YOU RECEIVE?  WHEN WILL IT BE COMPLETED?  DO YOU NEED ANYTHING FROM HIM?  PLEASE CALL PATIENT TO DISCUSS.  THANK YOU

## 2022-11-29 NOTE — TELEPHONE ENCOUNTER
CALLED PATIENT TO INFORM HIM HIS PAPERWORK IS DONE JUST WAITING ON PHYSICIAN TO SIGN THEN I WILL FAX.

## 2022-12-01 ENCOUNTER — TELEPHONE (OUTPATIENT)
Dept: GASTROENTEROLOGY | Facility: CLINIC | Age: 57
End: 2022-12-01

## 2022-12-12 ENCOUNTER — HOSPITAL ENCOUNTER (OUTPATIENT)
Dept: CT IMAGING | Facility: HOSPITAL | Age: 57
Discharge: HOME | End: 2022-12-12
Admitting: SURGERY

## 2022-12-12 DIAGNOSIS — I73.89: ICD-10-CM

## 2022-12-12 PROCEDURE — 75635 CT ANGIO ABDOMINAL ARTERIES: CPT

## 2022-12-12 PROCEDURE — 0 IOPAMIDOL PER 1 ML: Performed by: SURGERY

## 2022-12-12 RX ADMIN — IOPAMIDOL 100 ML: 755 INJECTION, SOLUTION INTRAVENOUS at 16:15

## 2022-12-16 ENCOUNTER — ANESTHESIA EVENT (OUTPATIENT)
Dept: GASTROENTEROLOGY | Facility: HOSPITAL | Age: 57
End: 2022-12-16

## 2022-12-16 ENCOUNTER — ANESTHESIA (OUTPATIENT)
Dept: GASTROENTEROLOGY | Facility: HOSPITAL | Age: 57
End: 2022-12-16

## 2022-12-16 ENCOUNTER — APPOINTMENT (OUTPATIENT)
Dept: CT IMAGING | Facility: HOSPITAL | Age: 57
End: 2022-12-16

## 2022-12-16 ENCOUNTER — HOSPITAL ENCOUNTER (OUTPATIENT)
Facility: HOSPITAL | Age: 57
Setting detail: HOSPITAL OUTPATIENT SURGERY
Discharge: HOME | End: 2022-12-16
Attending: INTERNAL MEDICINE | Admitting: INTERNAL MEDICINE

## 2022-12-16 VITALS
HEIGHT: 71 IN | DIASTOLIC BLOOD PRESSURE: 86 MMHG | BODY MASS INDEX: 30.68 KG/M2 | SYSTOLIC BLOOD PRESSURE: 118 MMHG | HEART RATE: 68 BPM | TEMPERATURE: 97.4 F | RESPIRATION RATE: 20 BRPM | OXYGEN SATURATION: 94 % | WEIGHT: 219.14 LBS

## 2022-12-16 DIAGNOSIS — K57.92 DIVERTICULITIS: ICD-10-CM

## 2022-12-16 DIAGNOSIS — Z86.010 HISTORY OF COLON POLYPS: ICD-10-CM

## 2022-12-16 PROCEDURE — 45385 COLONOSCOPY W/LESION REMOVAL: CPT | Performed by: INTERNAL MEDICINE

## 2022-12-16 PROCEDURE — 88305 TISSUE EXAM BY PATHOLOGIST: CPT | Performed by: INTERNAL MEDICINE

## 2022-12-16 PROCEDURE — 25010000002 PROPOFOL 10 MG/ML EMULSION: Performed by: NURSE ANESTHETIST, CERTIFIED REGISTERED

## 2022-12-16 RX ORDER — PROPOFOL 10 MG/ML
VIAL (ML) INTRAVENOUS AS NEEDED
Status: DISCONTINUED | OUTPATIENT
Start: 2022-12-16 | End: 2022-12-16 | Stop reason: SURG

## 2022-12-16 RX ORDER — LIDOCAINE HYDROCHLORIDE 20 MG/ML
INJECTION, SOLUTION EPIDURAL; INFILTRATION; INTRACAUDAL; PERINEURAL AS NEEDED
Status: DISCONTINUED | OUTPATIENT
Start: 2022-12-16 | End: 2022-12-16 | Stop reason: SURG

## 2022-12-16 RX ORDER — SODIUM CHLORIDE, SODIUM LACTATE, POTASSIUM CHLORIDE, CALCIUM CHLORIDE 600; 310; 30; 20 MG/100ML; MG/100ML; MG/100ML; MG/100ML
30 INJECTION, SOLUTION INTRAVENOUS CONTINUOUS
Status: DISCONTINUED | OUTPATIENT
Start: 2022-12-16 | End: 2022-12-16 | Stop reason: HOSPADM

## 2022-12-16 RX ORDER — SODIUM CHLORIDE, SODIUM LACTATE, POTASSIUM CHLORIDE, CALCIUM CHLORIDE 600; 310; 30; 20 MG/100ML; MG/100ML; MG/100ML; MG/100ML
1000 INJECTION, SOLUTION INTRAVENOUS CONTINUOUS
Status: DISCONTINUED | OUTPATIENT
Start: 2022-12-16 | End: 2022-12-16 | Stop reason: HOSPADM

## 2022-12-16 RX ADMIN — LIDOCAINE HYDROCHLORIDE 100 MG: 20 INJECTION, SOLUTION EPIDURAL; INFILTRATION; INTRACAUDAL; PERINEURAL at 07:29

## 2022-12-16 RX ADMIN — PROPOFOL 200 MCG/KG/MIN: 10 INJECTION, EMULSION INTRAVENOUS at 07:29

## 2022-12-16 RX ADMIN — PROPOFOL 100 MG: 10 INJECTION, EMULSION INTRAVENOUS at 07:29

## 2022-12-16 RX ADMIN — SODIUM CHLORIDE, POTASSIUM CHLORIDE, SODIUM LACTATE AND CALCIUM CHLORIDE 1000 ML: 600; 310; 30; 20 INJECTION, SOLUTION INTRAVENOUS at 06:37

## 2022-12-16 NOTE — H&P
"Pre Procedure History & Physical    Chief Complaint:   F/u of diverticulitis    Subjective     HPI:   58 yo M here for f/u of diverticulitis.    Past Medical History:   Past Medical History:   Diagnosis Date   • Renal disorder        Past Surgical History:  Past Surgical History:   Procedure Laterality Date   • COLONOSCOPY     • KNEE ARTHROSCOPY Bilateral    • SHOULDER ARTHROSCOPY Right    • TONSILLECTOMY     • TRIGGER FINGER RELEASE Right        Family History:  Family History   Problem Relation Age of Onset   • Cancer Father    • Malig Hyperthermia Neg Hx        Social History:   reports that he has never smoked. He has quit using smokeless tobacco.  His smokeless tobacco use included chew. He reports that he does not currently use alcohol. He reports that he does not use drugs.    Medications:   Medications Prior to Admission   Medication Sig Dispense Refill Last Dose   • alfuzosin (UROXATRAL) 10 MG 24 hr tablet Take 10 mg by mouth Daily. as directed   Past Week   • aspirin 81 MG EC tablet 81 mg Daily.   Past Week       Allergies:  Patient has no known allergies.    ROS:    Pertinent items are noted in HPI     Objective     Blood pressure 130/92, pulse 67, temperature 97.7 °F (36.5 °C), temperature source Temporal, resp. rate 18, height 180.3 cm (71\"), weight 99.4 kg (219 lb 2.2 oz), SpO2 96 %.    Physical Exam   Constitutional: Pt is oriented to person, place, and time and well-developed, well-nourished, and in no distress.   Mouth/Throat: Oropharynx is clear and moist.   Neck: Normal range of motion.   Cardiovascular: Normal rate, regular rhythm and normal heart sounds.    Pulmonary/Chest: Effort normal and breath sounds normal.   Abdominal: Soft. Nontender  Skin: Skin is warm and dry.   Psychiatric: Mood, memory, affect and judgment normal.     Assessment & Plan     Diagnosis:  F/u of diverticulitis    Anticipated Surgical Procedure:  Colonoscopy    The risks, benefits, and alternatives of this procedure have " been discussed with the patient or the responsible party- the patient understands and agrees to proceed.

## 2022-12-16 NOTE — ANESTHESIA POSTPROCEDURE EVALUATION
Patient: Patel Monreal    Procedure Summary     Date: 12/16/22 Room / Location: [facility] ENDOSCOPY 4 / [facility] ENDOSCOPY    Anesthesia Start: 0728 Anesthesia Stop: 0751    Procedure: COLONOSCOPY WITH POLYPECTOMIES Diagnosis:       Diverticulitis      History of colon polyps      (Diverticulitis [K57.92])      (History of colon polyps [Z86.010])    Surgeons: Saniya Garza MD Provider: Esteban Lino MD    Anesthesia Type: general ASA Status: 1          Anesthesia Type: general    Vitals  Vitals Value Taken Time   /86 12/16/22 0804   Temp 36.3 °C (97.4 °F) 12/16/22 0804   Pulse 68 12/16/22 0804   Resp 20 12/16/22 0804   SpO2 94 % 12/16/22 0804           Post Anesthesia Care and Evaluation    Patient location during evaluation: bedside  Patient participation: complete - patient participated  Level of consciousness: awake  Pain management: adequate    Airway patency: patent  Anesthetic complications: No anesthetic complications  PONV Status: none  Cardiovascular status: acceptable and stable  Respiratory status: acceptable  Hydration status: acceptable    Comments: An Anesthesiologist personally participated in the most demanding procedures (including induction and emergence if applicable) in the anesthesia plan, monitored the course of anesthesia administration at frequent intervals and remained physically present and available for immediate diagnosis and treatment of emergencies.

## 2022-12-16 NOTE — ANESTHESIA PREPROCEDURE EVALUATION
Anesthesia Evaluation     Patient summary reviewed and Nursing notes reviewed   no history of anesthetic complications:  NPO Solid Status: > 8 hours  NPO Liquid Status: > 2 hours           Airway   Mallampati: II  TM distance: >3 FB  Neck ROM: full  No difficulty expected  Dental      Pulmonary - negative pulmonary ROS and normal exam    breath sounds clear to auscultation  Cardiovascular - negative cardio ROS and normal exam  Exercise tolerance: good (4-7 METS)    Rhythm: regular  Rate: normal        Neuro/Psych- negative ROS  GI/Hepatic/Renal/Endo - negative ROS     Musculoskeletal     Abdominal    Substance History      OB/GYN          Other        ROS/Med Hx Other: PAT Nursing Notes unavailable.                   Anesthesia Plan    ASA 1     general   total IV anesthesia    Anesthetic plan, risks, benefits, and alternatives have been provided, discussed and informed consent has been obtained with: patient.        CODE STATUS:

## 2022-12-19 ENCOUNTER — OFFICE VISIT (OUTPATIENT)
Dept: VASCULAR SURGERY | Facility: HOSPITAL | Age: 57
End: 2022-12-19

## 2022-12-19 VITALS
DIASTOLIC BLOOD PRESSURE: 80 MMHG | HEART RATE: 73 BPM | SYSTOLIC BLOOD PRESSURE: 120 MMHG | TEMPERATURE: 96.9 F | RESPIRATION RATE: 16 BRPM | OXYGEN SATURATION: 96 %

## 2022-12-19 DIAGNOSIS — I73.89: Primary | ICD-10-CM

## 2022-12-19 LAB
CYTO UR: NORMAL
LAB AP CASE REPORT: NORMAL
LAB AP CLINICAL INFORMATION: NORMAL
PATH REPORT.FINAL DX SPEC: NORMAL
PATH REPORT.GROSS SPEC: NORMAL

## 2022-12-19 PROCEDURE — G0463 HOSPITAL OUTPT CLINIC VISIT: HCPCS | Performed by: SURGERY

## 2022-12-19 PROCEDURE — 99212 OFFICE O/P EST SF 10 MIN: CPT | Performed by: SURGERY

## 2022-12-19 NOTE — PROGRESS NOTES
[facility]   Follow up Office    Patient Name: Patel Monreal  : 1965  MRN: [id number]  Primary Care Physician:  Anthony Berrios MD      Subjective   Subjective     HPI:    Patel Monreal is a 57 y.o. male here for follow-up for findings on MRI suggestive of popliteal adventitial cystic disease.  This is 6 months later with a CT scan.  Doing okay otherwise.  No complaints.  No leg problems.      Objective     Vitals:   Temp:  [96.9 °F (36.1 °C)] 96.9 °F (36.1 °C)  Heart Rate:  [73] 73  Resp:  [16] 16  BP: (120)/(80) 120/80    Physical Exam      General: Alert, no acute distress.  Extremities: Symmetric.    Diagnostic studies: A CTA of the aorta and bilateral lower extremities dated 2022 has been reviewed.  The left popliteal artery is normal and without any evidence of cystic structures immediately surrounding, no evidence of popliteal adventitial cystic disease.  Normal CTA.    Assessment & Plan   Assessment / Plan     Diagnoses and all orders for this visit:    1. 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 Monreal's left popliteal cyst seems to have resolved.  There is no evidence of popliteal adventitial cystic disease.  I have discussed the findings of the CTA with him.  At this time I am recommending for him to follow-up with us as needed.        Electronically signed by Aguilar Alva MD, 22, 2:02 PM EST.

## 2022-12-27 ENCOUNTER — OFFICE VISIT (OUTPATIENT)
Dept: ORTHOPEDIC SURGERY | Facility: CLINIC | Age: 57
End: 2022-12-27

## 2022-12-27 ENCOUNTER — PREP FOR SURGERY (OUTPATIENT)
Dept: OTHER | Facility: HOSPITAL | Age: 57
End: 2022-12-27

## 2022-12-27 VITALS — BODY MASS INDEX: 32.34 KG/M2 | HEART RATE: 88 BPM | WEIGHT: 231 LBS | HEIGHT: 71 IN | OXYGEN SATURATION: 99 %

## 2022-12-27 DIAGNOSIS — M75.22 BICEPS TENDONITIS, LEFT: ICD-10-CM

## 2022-12-27 DIAGNOSIS — M24.112 LABRAL TEAR OF SHOULDER, DEGENERATIVE, LEFT: ICD-10-CM

## 2022-12-27 DIAGNOSIS — M75.82 ROTATOR CUFF TENDONITIS, LEFT: Primary | ICD-10-CM

## 2022-12-27 DIAGNOSIS — M19.019 OSTEOARTHRITIS OF AC (ACROMIOCLAVICULAR) JOINT: ICD-10-CM

## 2022-12-27 DIAGNOSIS — M75.112 INCOMPLETE TEAR OF LEFT ROTATOR CUFF, UNSPECIFIED WHETHER TRAUMATIC: ICD-10-CM

## 2022-12-27 DIAGNOSIS — M19.012 PRIMARY OSTEOARTHRITIS OF LEFT SHOULDER: ICD-10-CM

## 2022-12-27 PROCEDURE — 99214 OFFICE O/P EST MOD 30 MIN: CPT | Performed by: ORTHOPAEDIC SURGERY

## 2022-12-27 RX ORDER — FLUOROURACIL 50 MG/G
CREAM TOPICAL
COMMUNITY
Start: 2022-12-20 | End: 2023-02-02

## 2022-12-27 RX ORDER — CHLORAL HYDRATE 500 MG
CAPSULE ORAL
COMMUNITY
Start: 2022-10-30 | End: 2023-02-02

## 2022-12-27 RX ORDER — CEFAZOLIN SODIUM 2 G/100ML
2 INJECTION, SOLUTION INTRAVENOUS ONCE
Status: CANCELLED | OUTPATIENT
Start: 2022-12-27 | End: 2022-12-27

## 2022-12-27 NOTE — PROGRESS NOTES
"Chief Complaint  Follow-up of the Left Shoulder     Subjective      Patel Monreal presents to [facility] for follow up evaluation of the left shoulder. The patient has been treating his left shoulder tendonitis, partial rotator cuff tear, biceps tendonitis, acromioclavicular joint osteoarthritis, osteoarthritis and labral tear conservatively. He still has some pain with certain ROM. He cant sleep on that side. He attended physical therapy and had an injection without significant relief.     No Known Allergies     Social History     Socioeconomic History   • Marital status:    Tobacco Use   • Smoking status: Never   • Smokeless tobacco: Former     Types: Chew   Vaping Use   • Vaping Use: Never used   Substance and Sexual Activity   • Alcohol use: Not Currently     Comment: rare   • Drug use: Never   • Sexual activity: Defer        Review of Systems     Objective   Vital Signs:   Pulse 88   Ht 180.3 cm (71\")   Wt 105 kg (231 lb)   SpO2 99%   BMI 32.22 kg/m²       Physical Exam  Constitutional:       Appearance: Normal appearance. The patient is well-developed and normal weight.   HENT:      Head: Normocephalic.      Right Ear: Hearing and external ear normal.      Left Ear: Hearing and external ear normal.      Nose: Nose normal.   Eyes:      Conjunctiva/sclera: Conjunctivae normal.   Cardiovascular:      Rate and Rhythm: Normal rate.   Pulmonary:      Effort: Pulmonary effort is normal.      Breath sounds: No wheezing or rales.   Abdominal:      Palpations: Abdomen is soft.      Tenderness: There is no abdominal tenderness.   Musculoskeletal:      Cervical back: Normal range of motion.   Skin:     Findings: No rash.   Neurological:      Mental Status: The patient is alert and oriented to person, place, and time.   Psychiatric:         Mood and Affect: Mood and affect normal.         Judgment: Judgment normal.       Ortho Exam      Left shoulder- non-tender. Neurovascularly " intact. Forward elevation 175 with pain. Abduction 160. External Rotation 70. Internal rotation 50. Sensation to light touch median, radial, ulnar nerve. Positive AIN, PIN, ulnar nerve motor function. 4+ supraspinatus strength, 5/5 infraspinatus and subscapularis. Internal rotation to L-4. Positive impingement signs. Pain with cross arm adduction. Positive O'jay.     Procedures    Imaging Results (Most Recent)     None           Result Review :       CT Angio Abdominal Aorta Bilateral Iliofem Runoff    Result Date: 12/13/2022  Narrative: PROCEDURE: CT ANGIO ABDOMINAL AORTA BILAT ILIOFEM RUNOFF W WO CONTRAST  COMPARISON: [facility], CT, ABDOMEN W/WO CONTRAST, 2/06/2007, 9:50.  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  TECHNIQUE: After obtaining the patient's consent, CT images of the abdomen, pelvis, and lower extremities were obtained without and with non-ionic intravenous contrast material. Multi-planar reformatted/3-D images were created to optimize visualization of vascular anatomy.   PROTOCOL:   Standard imaging protocol performed    RADIATION:   DLP: 602mGy*cm   Automated exposure control was utilized to minimize radiation dose. CONTRAST: 100cc Isovue 370 I.V.  FINDINGS:  CT angiography: Aortoiliac:  The visualized descending thoracic aorta is normal.  The celiac branch vessels, superior mesenteric artery and distal branches and solitary bilateral main renal arteries appear within normal limits.  The inferior mesenteric artery is patent.  There is minimal non stenosing occasional plaque in the abdominal aorta.  The common, internal and external iliac arteries appear normal bilaterally.  Right lower extremity:  The common femoral, profunda femoral and superficial femoral arteries appear normal.  Popliteal artery is normal.  The popliteal artery does not appear anomalous and there are no adjacent anomalous structures. The anterior tibial artery and tibioperoneal trunk  appear normal.  The posterior tibial and peroneal arteries are patent and there is 3 vessel runoff into the right foot.  Left lower extremity: The common femoral, profunda femoral and superficial femoral arteries appear normal.  Popliteal artery is normal.  The popliteal artery does not appear anomalous and there are no adjacent anomalous structures.  The anterior tibial artery and tibioperoneal trunk appear normal.  The posterior tibial and peroneal arteries are patent and there is 3 vessel runoff into the left foot.      Impression:  Normal aortofemoral runoff CTA.  No CT evidence of adventitial cystic disease or anomalous arterial anatomy.     JEZ MORALEZ MD       Electronically Signed and Approved By: JEZ MORALEZ MD on 12/13/2022 at 10:58                      Assessment and Plan     Diagnoses and all orders for this visit:    1. Rotator cuff tendonitis, left (Primary)    2. Incomplete tear of left rotator cuff, unspecified whether traumatic    3. Biceps tendonitis, left    4. Osteoarthritis of AC (acromioclavicular) joint    5. Primary osteoarthritis of left shoulder    6. Labral tear of shoulder, degenerative, left        Discussed the treatment options with the patient, operative vs non-operative. Discussed the risks and benefits of a left shoulder rotator cuff debridement vs repair, SAD/DCE possible biceps tenodesis. The patient expressed understanding and wished to proceed.     Discussed surgery., Risks/benefits discussed with patient including, but not limited to: infection, bleeding, neurovascular damage, malunion, nonunion, aesthetic deformity, need for further surgery, and death., Discussed with patient the implant type being used during surgery and patient understands and desires to proceed., Surgery pamphlet given. and Call or return if worsening symptoms.    Follow Up     2 weeks postoperatively.        Patient was given instructions and counseling regarding his condition or for health  maintenance advice. Please see specific information pulled into the AVS if appropriate.     Scribed for Major Hickman MD by Lucia Peters.  12/27/22   08:02 EST    I have personally performed the services described in this document as scribed by the above individual and it is both accurate and complete. Major Hickman MD 12/27/22

## 2022-12-28 ENCOUNTER — TELEPHONE (OUTPATIENT)
Dept: ORTHOPEDIC SURGERY | Facility: CLINIC | Age: 57
End: 2022-12-28

## 2022-12-28 NOTE — TELEPHONE ENCOUNTER
Caller: LYNN GOOD    Relationship to patient: SELF    PATIENT WAS IN YESTERDAY 12 27 22 AND WAS SCHEDULED FOR 01 23 23 AND IS NEEDING TO DELAY IT FOR A COUPLE OF WEEKS, TO 02 06 23  (IF POSSIBLE) - HUB ATTEMPTED TO WARM TRANSFER - AND ADVISED THE PATIENT WOULD NOTIFY CLINICAL TO GIVE HIM A CALL BACK  [phone].

## 2023-02-02 NOTE — PRE-PROCEDURE INSTRUCTIONS
IMPORTANT INSTRUCTIONS - PRE-ADMISSION TESTING  1. DO NOT EAT OR CHEW anything after midnight the night before your procedure.    2. You may have CLEAR liquids up to ___2___ hours prior to ARRIVAL time.   3. Take the following medications the morning of your procedure with JUST A SIP OF WATER:  __ALFUZOSIN _____________________________________________________________________________________________________________________________________________________________________________________    4. DO NOT BRING your medications to the hospital with you, UNLESS something has changed since your PRE-Admission Testing appointment.  5. Hold all vitamins, supplements, and NSAIDS (Non- steroidal anti-inflammatory meds) for one week prior to surgery (you MAY take Tylenol or Acetaminophen).  6. If you are diabetic, check your blood sugar the morning of your procedure. If it is less than 70 or if you are feeling symptomatic, call the following number for further instructions: [phone]  7. Use your inhalers/nebulizers as usual, the morning of your procedure. BRING YOUR INHALERS with you.   8. Bring your CPAP or BIPAP to hospital, ONLY IF YOU WILL BE SPENDING THE NIGHT.   9. Make sure you have a ride home and have someone who will stay with you the day of your procedure after you go home.  10. If you have any questions, please call your Pre-Admission Testing Nurse, __STEPHANIE______________ at [phone]____________.   11. Per anesthesia request, do not smoke for 24 hours before your procedure or as instructed by your surgeon.   12. BATHING INSTRUCTIONS GIVEN. NO JEWELRY DAY OF PROCEDURE.   13. ENTRANCE [facility]

## 2023-02-06 ENCOUNTER — ANESTHESIA EVENT (OUTPATIENT)
Dept: PERIOP | Facility: HOSPITAL | Age: 58
End: 2023-02-06
Payer: COMMERCIAL

## 2023-02-06 ENCOUNTER — HOSPITAL ENCOUNTER (OUTPATIENT)
Facility: HOSPITAL | Age: 58
Discharge: HOME | End: 2023-02-06
Attending: ORTHOPAEDIC SURGERY | Admitting: ORTHOPAEDIC SURGERY
Payer: COMMERCIAL

## 2023-02-06 ENCOUNTER — ANESTHESIA (OUTPATIENT)
Dept: PERIOP | Facility: HOSPITAL | Age: 58
End: 2023-02-06
Payer: COMMERCIAL

## 2023-02-06 VITALS
TEMPERATURE: 97 F | RESPIRATION RATE: 20 BRPM | WEIGHT: 225.09 LBS | SYSTOLIC BLOOD PRESSURE: 136 MMHG | HEART RATE: 67 BPM | OXYGEN SATURATION: 96 % | DIASTOLIC BLOOD PRESSURE: 90 MMHG | BODY MASS INDEX: 31.51 KG/M2 | HEIGHT: 71 IN

## 2023-02-06 DIAGNOSIS — M75.112 INCOMPLETE TEAR OF LEFT ROTATOR CUFF, UNSPECIFIED WHETHER TRAUMATIC: Primary | ICD-10-CM

## 2023-02-06 DIAGNOSIS — M75.82 ROTATOR CUFF TENDONITIS, LEFT: ICD-10-CM

## 2023-02-06 PROCEDURE — 25010000002 ONDANSETRON PER 1 MG: Performed by: NURSE ANESTHETIST, CERTIFIED REGISTERED

## 2023-02-06 PROCEDURE — 25010000002 CEFAZOLIN IN DEXTROSE 2-4 GM/100ML-% SOLUTION: Performed by: ORTHOPAEDIC SURGERY

## 2023-02-06 PROCEDURE — 29824 SHO ARTHRS SRG DSTL CLAVICLC: CPT | Performed by: ORTHOPAEDIC SURGERY

## 2023-02-06 PROCEDURE — 29826 SHO ARTHRS SRG DECOMPRESSION: CPT | Performed by: ORTHOPAEDIC SURGERY

## 2023-02-06 PROCEDURE — 25010000002 DEXAMETHASONE PER 1 MG: Performed by: NURSE ANESTHETIST, CERTIFIED REGISTERED

## 2023-02-06 PROCEDURE — 29823 SHO ARTHRS SRG XTNSV DBRDMT: CPT | Performed by: ORTHOPAEDIC SURGERY

## 2023-02-06 PROCEDURE — 0 MEPERIDINE PER 100 MG: Performed by: ANESTHESIOLOGY

## 2023-02-06 PROCEDURE — 25010000002 PROPOFOL 10 MG/ML EMULSION: Performed by: NURSE ANESTHETIST, CERTIFIED REGISTERED

## 2023-02-06 PROCEDURE — 93005 ELECTROCARDIOGRAM TRACING: CPT | Performed by: ANESTHESIOLOGY

## 2023-02-06 PROCEDURE — 25010000002 MIDAZOLAM PER 1 MG: Performed by: ANESTHESIOLOGY

## 2023-02-06 PROCEDURE — 25010000002 FENTANYL CITRATE (PF) 50 MCG/ML SOLUTION: Performed by: NURSE ANESTHETIST, CERTIFIED REGISTERED

## 2023-02-06 PROCEDURE — L3670 SO ACRO/CLAV CAN WEB PRE OTS: HCPCS | Performed by: ORTHOPAEDIC SURGERY

## 2023-02-06 RX ORDER — SODIUM CHLORIDE, SODIUM LACTATE, POTASSIUM CHLORIDE, CALCIUM CHLORIDE 600; 310; 30; 20 MG/100ML; MG/100ML; MG/100ML; MG/100ML
9 INJECTION, SOLUTION INTRAVENOUS CONTINUOUS PRN
Status: DISCONTINUED | OUTPATIENT
Start: 2023-02-06 | End: 2023-02-06 | Stop reason: HOSPADM

## 2023-02-06 RX ORDER — IBUPROFEN 800 MG/1
800 TABLET ORAL EVERY 8 HOURS PRN
Qty: 60 TABLET | Refills: 1 | Status: SHIPPED | OUTPATIENT
Start: 2023-02-06

## 2023-02-06 RX ORDER — BUPIVACAINE HYDROCHLORIDE AND EPINEPHRINE 5; 5 MG/ML; UG/ML
INJECTION, SOLUTION EPIDURAL; INTRACAUDAL; PERINEURAL
Status: COMPLETED | OUTPATIENT
Start: 2023-02-06 | End: 2023-02-06

## 2023-02-06 RX ORDER — OXYCODONE HYDROCHLORIDE 5 MG/1
5 TABLET ORAL
Status: DISCONTINUED | OUTPATIENT
Start: 2023-02-06 | End: 2023-02-06 | Stop reason: HOSPADM

## 2023-02-06 RX ORDER — MAGNESIUM HYDROXIDE 1200 MG/15ML
LIQUID ORAL AS NEEDED
Status: DISCONTINUED | OUTPATIENT
Start: 2023-02-06 | End: 2023-02-06 | Stop reason: HOSPADM

## 2023-02-06 RX ORDER — ONDANSETRON 2 MG/ML
INJECTION INTRAMUSCULAR; INTRAVENOUS AS NEEDED
Status: DISCONTINUED | OUTPATIENT
Start: 2023-02-06 | End: 2023-02-06 | Stop reason: SURG

## 2023-02-06 RX ORDER — HYDROCODONE BITARTRATE AND ACETAMINOPHEN 7.5; 325 MG/1; MG/1
1 TABLET ORAL EVERY 4 HOURS PRN
Qty: 40 TABLET | Refills: 0 | Status: SHIPPED | OUTPATIENT
Start: 2023-02-06

## 2023-02-06 RX ORDER — PROMETHAZINE HYDROCHLORIDE 12.5 MG/1
25 TABLET ORAL ONCE AS NEEDED
Status: DISCONTINUED | OUTPATIENT
Start: 2023-02-06 | End: 2023-02-06 | Stop reason: HOSPADM

## 2023-02-06 RX ORDER — MEPERIDINE HYDROCHLORIDE 25 MG/ML
25 INJECTION INTRAMUSCULAR; INTRAVENOUS; SUBCUTANEOUS ONCE
Status: COMPLETED | OUTPATIENT
Start: 2023-02-06 | End: 2023-02-06

## 2023-02-06 RX ORDER — LIDOCAINE HYDROCHLORIDE 20 MG/ML
INJECTION, SOLUTION EPIDURAL; INFILTRATION; INTRACAUDAL; PERINEURAL AS NEEDED
Status: DISCONTINUED | OUTPATIENT
Start: 2023-02-06 | End: 2023-02-06 | Stop reason: SURG

## 2023-02-06 RX ORDER — MEPERIDINE HYDROCHLORIDE 25 MG/ML
12.5 INJECTION INTRAMUSCULAR; INTRAVENOUS; SUBCUTANEOUS
Status: DISCONTINUED | OUTPATIENT
Start: 2023-02-06 | End: 2023-02-06 | Stop reason: HOSPADM

## 2023-02-06 RX ORDER — DEXAMETHASONE SODIUM PHOSPHATE 4 MG/ML
INJECTION, SOLUTION INTRA-ARTICULAR; INTRALESIONAL; INTRAMUSCULAR; INTRAVENOUS; SOFT TISSUE AS NEEDED
Status: DISCONTINUED | OUTPATIENT
Start: 2023-02-06 | End: 2023-02-06 | Stop reason: SURG

## 2023-02-06 RX ORDER — MIDAZOLAM HYDROCHLORIDE 1 MG/ML
2 INJECTION INTRAMUSCULAR; INTRAVENOUS ONCE
Status: COMPLETED | OUTPATIENT
Start: 2023-02-06 | End: 2023-02-06

## 2023-02-06 RX ORDER — CEFAZOLIN SODIUM 2 G/100ML
2 INJECTION, SOLUTION INTRAVENOUS ONCE
Status: COMPLETED | OUTPATIENT
Start: 2023-02-06 | End: 2023-02-06

## 2023-02-06 RX ORDER — PROMETHAZINE HYDROCHLORIDE 25 MG/1
25 SUPPOSITORY RECTAL ONCE AS NEEDED
Status: DISCONTINUED | OUTPATIENT
Start: 2023-02-06 | End: 2023-02-06 | Stop reason: HOSPADM

## 2023-02-06 RX ORDER — FENTANYL CITRATE 50 UG/ML
INJECTION, SOLUTION INTRAMUSCULAR; INTRAVENOUS AS NEEDED
Status: DISCONTINUED | OUTPATIENT
Start: 2023-02-06 | End: 2023-02-06 | Stop reason: SURG

## 2023-02-06 RX ORDER — ROCURONIUM BROMIDE 10 MG/ML
INJECTION, SOLUTION INTRAVENOUS AS NEEDED
Status: DISCONTINUED | OUTPATIENT
Start: 2023-02-06 | End: 2023-02-06 | Stop reason: SURG

## 2023-02-06 RX ORDER — GLYCOPYRROLATE 0.2 MG/ML
INJECTION INTRAMUSCULAR; INTRAVENOUS AS NEEDED
Status: DISCONTINUED | OUTPATIENT
Start: 2023-02-06 | End: 2023-02-06 | Stop reason: SURG

## 2023-02-06 RX ORDER — ONDANSETRON 2 MG/ML
4 INJECTION INTRAMUSCULAR; INTRAVENOUS ONCE AS NEEDED
Status: DISCONTINUED | OUTPATIENT
Start: 2023-02-06 | End: 2023-02-06 | Stop reason: HOSPADM

## 2023-02-06 RX ORDER — PROPOFOL 10 MG/ML
VIAL (ML) INTRAVENOUS AS NEEDED
Status: DISCONTINUED | OUTPATIENT
Start: 2023-02-06 | End: 2023-02-06 | Stop reason: SURG

## 2023-02-06 RX ORDER — ACETAMINOPHEN 500 MG
1000 TABLET ORAL ONCE
Status: COMPLETED | OUTPATIENT
Start: 2023-02-06 | End: 2023-02-06

## 2023-02-06 RX ADMIN — SODIUM CHLORIDE, POTASSIUM CHLORIDE, SODIUM LACTATE AND CALCIUM CHLORIDE 9 ML/HR: 600; 310; 30; 20 INJECTION, SOLUTION INTRAVENOUS at 10:03

## 2023-02-06 RX ADMIN — MEPERIDINE HYDROCHLORIDE 25 MG: 25 INJECTION INTRAMUSCULAR; INTRAVENOUS; SUBCUTANEOUS at 11:28

## 2023-02-06 RX ADMIN — ONDANSETRON 4 MG: 2 INJECTION INTRAMUSCULAR; INTRAVENOUS at 13:50

## 2023-02-06 RX ADMIN — MIDAZOLAM HYDROCHLORIDE 2 MG: 1 INJECTION, SOLUTION INTRAMUSCULAR; INTRAVENOUS at 11:28

## 2023-02-06 RX ADMIN — PROPOFOL 150 MG: 10 INJECTION, EMULSION INTRAVENOUS at 12:29

## 2023-02-06 RX ADMIN — ROCURONIUM BROMIDE 40 MG: 10 INJECTION, SOLUTION INTRAVENOUS at 12:29

## 2023-02-06 RX ADMIN — LIDOCAINE HYDROCHLORIDE 60 MG: 20 INJECTION, SOLUTION EPIDURAL; INFILTRATION; INTRACAUDAL; PERINEURAL at 12:29

## 2023-02-06 RX ADMIN — ACETAMINOPHEN 1000 MG: 500 TABLET ORAL at 10:03

## 2023-02-06 RX ADMIN — GLYCOPYRROLATE 0.2 MG: 0.2 INJECTION INTRAMUSCULAR; INTRAVENOUS at 13:06

## 2023-02-06 RX ADMIN — DEXAMETHASONE SODIUM PHOSPHATE 4 MG: 4 INJECTION, SOLUTION INTRA-ARTICULAR; INTRALESIONAL; INTRAMUSCULAR; INTRAVENOUS; SOFT TISSUE at 12:46

## 2023-02-06 RX ADMIN — CEFAZOLIN SODIUM 2 G: 2 INJECTION, SOLUTION INTRAVENOUS at 12:36

## 2023-02-06 RX ADMIN — BUPIVACAINE HYDROCHLORIDE AND EPINEPHRINE BITARTRATE 30 ML: 5; .005 INJECTION, SOLUTION EPIDURAL; INTRACAUDAL; PERINEURAL at 11:32

## 2023-02-06 RX ADMIN — LIDOCAINE HYDROCHLORIDE 40 MG: 20 INJECTION, SOLUTION EPIDURAL; INFILTRATION; INTRACAUDAL; PERINEURAL at 13:50

## 2023-02-06 RX ADMIN — FENTANYL CITRATE 50 MCG: 50 INJECTION, SOLUTION INTRAMUSCULAR; INTRAVENOUS at 12:29

## 2023-02-06 RX ADMIN — ROCURONIUM BROMIDE 10 MG: 10 INJECTION, SOLUTION INTRAVENOUS at 13:06

## 2023-02-06 NOTE — OP NOTE
SHOULDER ARTHROSCOPY WITH ROTATOR CUFF REPAIR  Procedure Report    Patient Name:  Patel Monreal  YOB: 1965    Date of Surgery:  2/6/2023     Indications:  Patient has a partial rotator cuff tear and osteoarthritis and has failed conservative treatment. Risks and benefits of surgery were discussed, including bleeding, infection, damage to neurovascular structures, anesthesia complications including death, continued pain and disability, need for additional procedures, among others. Informed consent was obtained and they wished to proceed.    Pre-op Diagnosis:   Rotator cuff tendonitis, left [M75.82]       Post-Op Diagnosis Codes:     * Rotator cuff tendonitis, left [M75.82]   Left shoulder chondromalacia  Left acromioclavicular osteoarthritis  Partial rotator cuff tear left shoulder    Procedure/CPT® Codes:      Procedure(s):  Left SHOULDER ARTHROSCOPY WITH ROTATOR CUFF AND LABRAL DEBRIDMENT, DISTAL CLAVICLE EXCISION, SUBCROMIAL DECOMPRESSION, and chondroplasty    Staff:  Surgeon(s):  Major Hickman MD    Assistant: Mario Singh RN    Anesthesia: General    Estimated Blood Loss: 5cc    Implants:    Nothing was implanted during the procedure    Specimen:          None        Findings: rotator cuff tear, partial, grade 2-3 chondromalacia glenohumeral joint, subacromial impingement, bursitis    Complications: none    Description of Procedure: The operative site was marked in preoperative holding area.  Interscalene nerve block was performed by the anesthesia provider.  The patient was brought the operating room and general anesthesia was applied.  There were placed in the lateral decubitus position.  An axillary roll was placed and the patient was secured with a deflated beanbag.  The legs were padded and SCD boots were placed.  The contralateral limb was placed on an arm board and the affected limb was prepped and draped in usual sterile fashion and placed into a sterile traction arm kwong.   Preoperative antibiotic was given.  Formal timeout was held.    Standard posterior portal was established and the arthroscope was inserted into the glenohumeral joint.  An anterior portal was established in the rotator cuff interval and a cannula was placed anteriorly.  The joint was inspected and the intra-articular and findings included grade II-III chondromalacia of the humeral head.  The glenoid cartilage was mostly intact with minimal chondromalacia.  The motorized shaver was used to form a chondroplasty of the glenohumeral joint.  The labrum was degenerative but there is no detached labral tear.  The labrum was debrided with a motorized shaver for a labral debridement.  The biceps anchor and tendon were intact.  The subscapularis and rotator cuff had partial undersurface tears which were low-grade and were debrided with a motorized shaver.    The arthroscope was removed and reinserted into the subacromial space posteriorly.  The lateral portal was established of the lateral acromion.  A motorized shaver and ablation probe were used to clear the subacromial space of adhesions and bursitis.  A motorized bur was used to resect approximately 1 cm from the anterior acromion and flatten the acromion from anterior to posterior and lateral to medial.      The subacromial findings include mild fraying of the rotator cuff.  No high-grade bursal sided tear or full-thickness tear.  Very large downward sloping acromial spur creating subacromial impingement.  Advanced subacromial bursitis.  Acromioclavicular osteoarthritis with osteophytes.    The motorized bur was used to resect 1 cm from the distal clavicle.  This was resected from anterior to posterior and lateral to medial to create a equal resection.    Sterile dressing was placed.  The patient was placed into cold therapy and an immobilizer.  The patient awoke from anesthesia in stable condition.  There is no complications.  All counts were correct.    Assistant:  Mario Singh, LIZETH  was responsible for performing the following activities: Retraction, Suction, Irrigation and Placing Dressing and their skilled assistance was necessary for the success of this case.    Major Hickman MD     Date: 2/6/2023  Time: 14:00 EST

## 2023-02-06 NOTE — H&P
[facility]   HISTORY AND PHYSICAL    Patient Name: Patel Monreal  : 1965  MRN: [id number]  Primary Care Physician:  Anthony Berrios MD  Date of admission: (Not on file)    Subjective   Subjective     Chief Complaint: Left shoulder pain    History of Present Illness: The patient is a 57-year-old male with left shoulder pain.  He reports pain that is worsening despite conservative treatment.  He reports shoulder pain and difficulty using the arm.  There is pain with activity and rest.    Review of Systems : Negative except for those mentioned in the history of present illness    Personal History     Past Medical History:   Diagnosis Date   • Rotator cuff tendonitis, left    • Urinary frequency        Past Surgical History:   Procedure Laterality Date   • COLONOSCOPY     • COLONOSCOPY N/A 2022    Procedure: COLONOSCOPY WITH POLYPECTOMIES;  Surgeon: Saniya Garza MD;  Location: [facility];  Service: Gastroenterology;  Laterality: N/A;  COLON POLYPS, DIVERTICULOSIS   • KNEE ARTHROSCOPY Bilateral    • SHOULDER ARTHROSCOPY Right    • TONSILLECTOMY     • TRIGGER FINGER RELEASE Right        Family History: family history includes Cancer in his father. Otherwise pertinent FHx was reviewed and not pertinent to current issue.    Social History:  reports that he has never smoked. He has quit using smokeless tobacco.  His smokeless tobacco use included chew. He reports current alcohol use. He reports that he does not use drugs.    Home Medications:  alfuzosin and aspirin    Allergies:  No Known Allergies    Objective    Objective     Vitals:        Physical Exam   General: No apparent distress, alert and oriented x3  HEENT: Normocephalic/atraumatic  Neck: Supple  Cardiovascular: Regular heart rate  Chest: Unlabored breathing  Abdomen: Soft, nontender and nondistended  Musculoskeletal: Neurovascular intact to extremity.  Positive pulses.  Pain with crossarm adduction.  Positive impingement signs.   Range of motion and strength grossly intact.  Neurological: Grossly intact    Result Review    Result Review:  I have personally reviewed the results from the time of this admission to 2/5/2023 20:57 EST and agree with these findings:  []  Laboratory list / accordion  []  Microbiology  [x]  Radiology  []  EKG/Telemetry   []  Cardiology/Vascular   []  Pathology  []  Old records  []  Other:  Most notable findings include: MRI reveals partial rotator cuff tear, arthritis      Assessment & Plan   Assessment / Plan     Brief Patient Summary:  Patel Monreal is a 57 y.o. male who has left shoulder rotator cuff tendinitis, partial tear, osteoarthritis    Active Hospital Problems:  Active Hospital Problems    Diagnosis    • **Rotator cuff tendonitis, left      Plan: I discussed treatment options with the patient.  Risks and benefits of operative treatment were discussed.  Informed consent was obtained and he wished to proceed with shoulder arthroscopy, rotator cuff debridement versus repair, SCD, DCE.      DVT prophylaxis:  No DVT prophylaxis order currently exists.    CODE STATUS:       Admission Status:  I believe this patient meets outpatient status.    Major Hickman MD

## 2023-02-06 NOTE — ANESTHESIA PREPROCEDURE EVALUATION
Anesthesia Evaluation     Patient summary reviewed and Nursing notes reviewed   no history of anesthetic complications:  NPO Solid Status: > 8 hours  NPO Liquid Status: > 2 hours           Airway   Mallampati: II  TM distance: >3 FB  Neck ROM: full  No difficulty expected  Comment: Large tongue  Dental      Pulmonary - negative pulmonary ROS and normal exam    breath sounds clear to auscultation  Cardiovascular - negative cardio ROS and normal exam  Exercise tolerance: good (4-7 METS)    Rhythm: regular  Rate: normal        Neuro/Psych- negative ROS  GI/Hepatic/Renal/Endo - negative ROS     Musculoskeletal     Abdominal    Substance History - negative use     OB/GYN negative ob/gyn ROS         Other   arthritis,      ROS/Med Hx Other: PAT Nursing Notes unavailable.                   Anesthesia Plan    ASA 1     general with block     (Patient understands anesthesia not responsible for dental damage.)  intravenous induction     Anesthetic plan, risks, benefits, and alternatives have been provided, discussed and informed consent has been obtained with: patient.  Pre-procedure education provided  Plan discussed with CRNA.        CODE STATUS:

## 2023-02-06 NOTE — ANESTHESIA POSTPROCEDURE EVALUATION
Patient: Patel Monreal    Procedure Summary     Date: 02/06/23 Room / Location: [facility] OR  / [facility]    Anesthesia Start: 1226 Anesthesia Stop: 1407    Procedure: SHOULDER ARTHROSCOPY WITH ROTATOR CUFF AND LABERAL DEBRIDMENT, DISTAL CLAVICLE EXCISION, AND SUBCROMIAL DECOMPRESSION (Left: Shoulder) Diagnosis:       Rotator cuff tendonitis, left      (Rotator cuff tendonitis, left [M75.82])    Surgeons: Major Hickman MD Provider: Bc Saldaña MD    Anesthesia Type: general with block ASA Status: 1          Anesthesia Type: general with block    Vitals  Vitals Value Taken Time   /93 02/06/23 1414   Temp 36.2 °C (97.1 °F) 02/06/23 1409   Pulse 83 02/06/23 1421   Resp 20 02/06/23 1414   SpO2 97 % 02/06/23 1421   Vitals shown include unvalidated device data.        Post Anesthesia Care and Evaluation    Patient location during evaluation: bedside  Patient participation: complete - patient participated  Level of consciousness: awake and alert  Pain management: adequate    Airway patency: patent  Anesthetic complications: No anesthetic complications  PONV Status: none  Cardiovascular status: acceptable  Respiratory status: acceptable  Hydration status: acceptable    Comments: An Anesthesiologist personally participated in the most demanding procedures (including induction and emergence if applicable) in the anesthesia plan, monitored the course of anesthesia administration at frequent intervals and remained physically present and available for immediate diagnosis and treatment of emergencies.

## 2023-02-06 NOTE — ANESTHESIA PROCEDURE NOTES
Peripheral Block      Patient reassessed immediately prior to procedure    Patient location during procedure: pre-op  Reason for block: at surgeon's request and post-op pain management  Preanesthetic Checklist  Completed: patient identified, IV checked, site marked, risks and benefits discussed, surgical consent, monitors and equipment checked, pre-op evaluation and timeout performed  Prep:  Pt Position: supine (HOB elevated)  Sterile barriers:cap, washed/disinfected hands, sterile barriers, gloves, mask, partial drape and alcohol skin prep  Prep: ChloraPrep  Patient monitoring: blood pressure monitoring, continuous pulse oximetry and EKG  Procedure    Sedation: yes  Performed under: local infiltration  Guidance:ultrasound guided and nerve stimulator    ULTRASOUND INTERPRETATION.  Using ultrasound guidance a 22 G gauge needle was placed in close proximity to the brachial plexus nerve, at which point, under ultrasound guidance anesthetic was injected in the area of the nerve and spread of the anesthesia was seen on ultrasound in close proximity thereto.  There were no abnormalities seen on ultrasound; a digital image was taken; and the patient tolerated the procedure with no complications. Images:still images obtained, printed/placed on chart    Laterality:left  Block Type:interscalene  Injection Technique:single-shot  Needle Type:echogenic  Needle Gauge:22 G (2in)  Resistance on Injection: none    Medications Used: bupivacaine-EPINEPHrine PF (MARCAINE w/EPI) 0.5% -[id number] injection - Injection   30 mL - 2/6/2023 11:32:00 AM      Post Assessment  Injection Assessment: negative aspiration for heme, no paresthesia on injection and incremental injection  Patient Tolerance:comfortable throughout block  Complications:no  Additional Notes  The block or continuous infusion is requested by the referring physician for management of postoperative pain, or pain related to a procedure. Ultrasound guidance (deemed medically  necessary). Painless injection, pt was awake and conversant during the procedure without complications. Needle and surrounding structures visualized throughout procedure. No adverse reactions or complications seen during this period. Post-procedure image showed no signs of complication, and anatomy was consistent with an uncomplicated nerve blockade.

## 2023-02-14 LAB — QT INTERVAL: 423 MS

## 2023-02-19 PROBLEM — Z47.89 AFTERCARE FOLLOWING SURGERY OF THE MUSCULOSKELETAL SYSTEM: Status: ACTIVE | Noted: 2023-02-19

## 2023-02-20 ENCOUNTER — OFFICE VISIT (OUTPATIENT)
Dept: ORTHOPEDIC SURGERY | Facility: CLINIC | Age: 58
End: 2023-02-20
Payer: COMMERCIAL

## 2023-02-20 VITALS — HEIGHT: 71 IN | OXYGEN SATURATION: 96 % | HEART RATE: 76 BPM | BODY MASS INDEX: 31.48 KG/M2 | WEIGHT: 224.87 LBS

## 2023-02-20 DIAGNOSIS — Z47.89 AFTERCARE FOLLOWING SURGERY OF THE MUSCULOSKELETAL SYSTEM: Primary | ICD-10-CM

## 2023-02-20 PROCEDURE — 99024 POSTOP FOLLOW-UP VISIT: CPT | Performed by: PHYSICIAN ASSISTANT

## 2023-02-20 NOTE — PROGRESS NOTES
"Chief Complaint  Pain and Follow-up of the Left Shoulder and Suture / Staple Removal    Subjective          History of Present Illness      Patel Monreal is a 57 y.o. male  presents to [facility] for     Patient presents for 2-week postoperative evaluation of left shoulder arthroscopic rotator cuff debridement, labral debridement, distal clavicle excision, subacromial decompression, chondroplasty, 2/6/2023.  Patient states he is already stopped using the pain medication mainly takes ibuprofen.  Patient states he has been compliant with sling use he wears it with activities.  He presents without the sling he states it is in his car.  Patient states incisions are healing well denies redness swelling drainage.  Denies fever chills.  Patient is attending therapy with good results.    No Known Allergies     Social History     Socioeconomic History   • Marital status:    Tobacco Use   • Smoking status: Never   • Smokeless tobacco: Former     Types: Chew   Vaping Use   • Vaping Use: Never used   Substance and Sexual Activity   • Alcohol use: Yes     Comment: rare   • Drug use: Never   • Sexual activity: Defer        REVIEW OF SYSTEMS    Constitutional: Denies fevers, chills, weight loss  Cardiovascular: Denies chest pain, shortness of breath  Skin: Denies rashes, acute skin changes  Neurologic: Denies headache, loss of consciousness  MSK: Left shoulder pain      Objective   Vital Signs:   Pulse 76   Ht 180.3 cm (71\")   Wt 102 kg (224 lb 13.9 oz)   SpO2 96%   BMI 31.36 kg/m²     Body mass index is 31.36 kg/m².    Physical Exam    Left shoulder: Incisions are healing well, no erythema drainage redness swelling or fluctuance/dehiscence.  Shoulder range of motion mildly limited secondary to stiffness, elbow wrist hand range of motion intact/appropriate, patient will wiggle fingers and thumb, makes a full fist.    Procedures    Imaging Results (Most Recent)     None           Result Review " :   The following data was reviewed by: OMER Wu on 02/20/2023:               Assessment and Plan    Diagnoses and all orders for this visit:    1. Aftercare following surgery of left shoulder arthroscopic rotator cuff debridment, labral debridement, distal clavicle excision, SAD, chondroplasty, 2/6/23 (Primary)        Reviewed operative images/operative report with the patient, discussed diagnosis and treatment options including continuing physical therapy, continue ibuprofen as needed, work on range of motion for strength/range of motion with therapy guidance.  Continue sling use for another 2 weeks.  Sutures/staples removed in office today. Steri-strips applied. Discussed incision care/hygiene. May shower, but do not submerge in water until fully healed.  Advised patient that if any concerning symptoms regarding incision appearance occur that they should call us right away, patient expressed understanding.  Follow-up in 4 weeks    Call or return if worsening symptoms.    Follow Up   Return in about 4 weeks (around 3/20/2023).  Patient was given instructions and counseling regarding his condition or for health maintenance advice. Please see specific information pulled into the AVS if appropriate.

## 2023-03-21 ENCOUNTER — OFFICE VISIT (OUTPATIENT)
Dept: ORTHOPEDIC SURGERY | Facility: CLINIC | Age: 58
End: 2023-03-21
Payer: COMMERCIAL

## 2023-03-21 VITALS — HEART RATE: 78 BPM | WEIGHT: 222 LBS | HEIGHT: 71 IN | OXYGEN SATURATION: 96 % | BODY MASS INDEX: 31.08 KG/M2

## 2023-03-21 DIAGNOSIS — Z47.89 AFTERCARE FOLLOWING SURGERY OF THE MUSCULOSKELETAL SYSTEM: Primary | ICD-10-CM

## 2023-03-21 PROCEDURE — 99024 POSTOP FOLLOW-UP VISIT: CPT | Performed by: PHYSICIAN ASSISTANT

## 2023-03-21 NOTE — PROGRESS NOTES
"Chief Complaint  Follow-up and Pain of the Left Shoulder    Subjective          History of Present Illness      Patel Monreal is a 57 y.o. male  presents to [facility] for     Patient presents for follow-up evaluation of left shoulder arthroscopic rotator cuff debridement, labral debridement, distal clavicle excision, subacromial decompression, chondroplasty, 2/6/2023.  He has only been taking ibuprofen 800 mg as needed since his surgery.  He states he still has pain with certain movements especially abduction type movement and he has trouble sleeping still due to shoulder pain and he cannot lay on his left side yet.  He finished sling use.  He is attending therapy with good results.  He brings a therapy note with good measurements.  He denies redness swelling from incision sites states these have healed well, no new complaints.      No Known Allergies     Social History     Socioeconomic History   • Marital status:    Tobacco Use   • Smoking status: Never   • Smokeless tobacco: Former     Types: Chew   Vaping Use   • Vaping Use: Never used   Substance and Sexual Activity   • Alcohol use: Yes     Alcohol/week: 1.0 standard drink     Types: 1 Cans of beer per week     Comment: rare   • Drug use: Never   • Sexual activity: Yes     Partners: Female     Birth control/protection: Surgical, Vasectomy        REVIEW OF SYSTEMS    Constitutional: Denies fevers, chills, weight loss  Cardiovascular: Denies chest pain, shortness of breath  Skin: Denies rashes, acute skin changes  Neurologic: Denies headache, loss of consciousness  MSK: Left shoulder pain      Objective   Vital Signs:   Pulse 78   Ht 180.3 cm (71\")   Wt 101 kg (222 lb)   SpO2 96%   BMI 30.96 kg/m²     Body mass index is 30.96 kg/m².    Physical Exam    Left shoulder: Well-healed incisions, no erythema, no ecchymosis, no swelling, active forward elevation 170, active abduction 120, external rotation with abduction 80, " internal rotation to T12, strength appropriate, neurovascular intact.    Procedures    Imaging Results (Most Recent)     None           Result Review :   The following data was reviewed by: OMER Wu on 03/21/2023:               Assessment and Plan    Diagnoses and all orders for this visit:    1. Aftercare following surgery of left shoulder arthroscopic rotator cuff debridment, labral debridement, distal clavicle excision, SAD, chondroplasty, 2/6/23 (Primary)        Discussed diagnosis and treatment options with the patient he was advised to continue therapy he has visits planned for the next few weeks, continue ibuprofen, he may call for refill, follow-up in 6 weeks for recheck.  Remain off of work at this time    Call or return if worsening symptoms.    Follow Up   Return in about 6 weeks (around 5/2/2023) for Recheck.  Patient was given instructions and counseling regarding his condition or for health maintenance advice. Please see specific information pulled into the AVS if appropriate.

## 2023-04-25 ENCOUNTER — TELEPHONE (OUTPATIENT)
Dept: ORTHOPEDIC SURGERY | Facility: CLINIC | Age: 58
End: 2023-04-25

## 2023-04-25 NOTE — TELEPHONE ENCOUNTER
Caller: LYNN   Relationship to Patient: SELF   Phone Number: [phone]  Reason for Call:  PATIENT CALLING TO INFORM THAT HE IS STILL HAVING SHOULDER PAIN

## 2023-04-25 NOTE — TELEPHONE ENCOUNTER
Spoke with patient and having achy pain still in shoulder. Will wait for appointment next Tuesday.

## 2023-05-02 ENCOUNTER — OFFICE VISIT (OUTPATIENT)
Dept: ORTHOPEDIC SURGERY | Facility: CLINIC | Age: 58
End: 2023-05-02
Payer: COMMERCIAL

## 2023-05-02 VITALS — WEIGHT: 222.66 LBS | HEART RATE: 88 BPM | BODY MASS INDEX: 31.17 KG/M2 | OXYGEN SATURATION: 95 % | HEIGHT: 71 IN

## 2023-05-02 DIAGNOSIS — Z47.89 AFTERCARE FOLLOWING SURGERY OF THE MUSCULOSKELETAL SYSTEM: Primary | ICD-10-CM

## 2023-05-02 RX ORDER — IBUPROFEN 800 MG/1
800 TABLET ORAL 2 TIMES DAILY
Qty: 60 TABLET | Refills: 2 | Status: SHIPPED | OUTPATIENT
Start: 2023-05-02

## 2023-05-02 RX ORDER — METHYLPREDNISOLONE ACETATE 80 MG/ML
80 INJECTION, SUSPENSION INTRA-ARTICULAR; INTRALESIONAL; INTRAMUSCULAR; SOFT TISSUE
Status: COMPLETED | OUTPATIENT
Start: 2023-05-02 | End: 2023-05-02

## 2023-05-02 RX ORDER — LIDOCAINE HYDROCHLORIDE 10 MG/ML
5 INJECTION, SOLUTION EPIDURAL; INFILTRATION; INTRACAUDAL; PERINEURAL
Status: COMPLETED | OUTPATIENT
Start: 2023-05-02 | End: 2023-05-02

## 2023-05-02 RX ADMIN — LIDOCAINE HYDROCHLORIDE 5 ML: 10 INJECTION, SOLUTION EPIDURAL; INFILTRATION; INTRACAUDAL; PERINEURAL at 08:45

## 2023-05-02 RX ADMIN — METHYLPREDNISOLONE ACETATE 80 MG: 80 INJECTION, SUSPENSION INTRA-ARTICULAR; INTRALESIONAL; INTRAMUSCULAR; SOFT TISSUE at 08:45

## 2023-05-02 NOTE — PROGRESS NOTES
"Chief Complaint  Follow-up of the Left Shoulder    Subjective          History of Present Illness      Patel Monreal is a 58 y.o. male  presents to [facility] for     Patient presents with his wife for follow-up evaluation of left shoulder arthroscopic rotator cuff debridement, labral debridement, distal clavicle excision, subacromial decompression, chondroplasty, 2/6/2023.  Patient states that he has been doing well with physical therapy he states he is ready to return to work as a .  He does admit to pain with trying to raise his arm above his head, he admits to pain at night he wakes up in the [place] sometimes with left shoulder pain.  He states pain he had prior to the surgery has improved but new pain with raising his arm above his head has been causing him some difficulty.  He states he has good strength and range of motion to do his job, would like to return to work on 5/8/2023      No Known Allergies     Social History     Socioeconomic History   • Marital status:    Tobacco Use   • Smoking status: Never   • Smokeless tobacco: Former     Types: Chew   Vaping Use   • Vaping Use: Never used   Substance and Sexual Activity   • Alcohol use: Yes     Alcohol/week: 1.0 standard drink     Types: 1 Cans of beer per week     Comment: rare   • Drug use: Never   • Sexual activity: Yes     Partners: Female     Birth control/protection: Surgical, Vasectomy        REVIEW OF SYSTEMS    Constitutional: Denies fevers, chills, weight loss  Cardiovascular: Denies chest pain, shortness of breath  Skin: Denies rashes, acute skin changes  Neurologic: Denies headache, loss of consciousness  MSK: Left shoulder pain      Objective   Vital Signs:   Pulse 88   Ht 180.3 cm (71\")   Wt 101 kg (222 lb 10.6 oz)   SpO2 95%   BMI 31.06 kg/m²     Body mass index is 31.06 kg/m².    Physical Exam    Left shoulder: Well-healed incisions, no erythema ecchymosis swelling or signs of infection, " active forward elevation 180, active abduction 140, external rotation 90, internal rotation 70, 5 out of 5 strength, neurovascular intact    Large Joint Arthrocentesis  Date/Time: 5/2/2023 8:45 AM  Consent given by: patient  Site marked: site marked  Timeout: Immediately prior to procedure a time out was called to verify the correct patient, procedure, equipment, support staff and site/side marked as required   Supporting Documentation  Indications: pain   Procedure Details  Location: shoulder (LEFT) -   Needle gauge: 21 G.  Medications administered: 80 mg methylPREDNISolone acetate 80 MG/ML; 5 mL lidocaine PF 1% 1 %  Patient tolerance: patient tolerated the procedure well with no immediate complications          Imaging Results (Most Recent)     None           Result Review :   The following data was reviewed by: OMER Wu on 05/02/2023:               Assessment and Plan    Diagnoses and all orders for this visit:    1. Aftercare following surgery of left shoulder arthroscopic rotator cuff debridment, labral debridement, distal clavicle excision, SAD, chondroplasty, 2/6/23 (Primary)    Other orders  -     ibuprofen (ADVIL,MOTRIN) 800 MG tablet; Take 1 tablet by mouth 2 (Two) Times a Day.  Dispense: 60 tablet; Refill: 2  -     Large Joint Arthrocentesis        Discussed diagnosis and treatment options with the patient he was advised we can do left shoulder steroid injection today, he elected to have this and tolerated well, he also was given a prescription for ibuprofen take as needed/as directed, return to work on 5/8/2023 follow-up in 6 weeks for recheck    Call or return if worsening symptoms.    Follow Up   Return in about 6 weeks (around 6/13/2023) for Recheck.  Patient was given instructions and counseling regarding his condition or for health maintenance advice. Please see specific information pulled into the AVS if appropriate.

## 2023-05-17 ENCOUNTER — CLINICAL SUPPORT (OUTPATIENT)
Dept: FAMILY MEDICINE CLINIC | Facility: CLINIC | Age: 58
End: 2023-05-17

## 2023-05-17 DIAGNOSIS — Z13.9 SCREENING DUE: Primary | ICD-10-CM

## 2023-06-13 ENCOUNTER — OFFICE VISIT (OUTPATIENT)
Dept: ORTHOPEDIC SURGERY | Facility: CLINIC | Age: 58
End: 2023-06-13
Payer: COMMERCIAL

## 2023-06-13 VITALS — HEIGHT: 71 IN | OXYGEN SATURATION: 94 % | BODY MASS INDEX: 31.08 KG/M2 | WEIGHT: 222 LBS | HEART RATE: 75 BPM

## 2023-06-13 DIAGNOSIS — Z47.89 AFTERCARE FOLLOWING SURGERY OF THE MUSCULOSKELETAL SYSTEM: Primary | ICD-10-CM

## 2023-06-13 NOTE — PROGRESS NOTES
"Chief Complaint  Follow-up of the Left Shoulder    Subjective          History of Present Illness      Patel Monreal is a 58 y.o. male  presents to [facility] for     Patient presents for follow-up evaluation of left shoulder arthroscopic rotator cuff debridement, labral debridement, distal clavicle excision, subacromial decompression and chondroplasty, 2/6/2023.  He has been back to work since 5/8/2023.  He states he has been doing well at work no concerns or pain at work with his activities.  He states when he wakes up in the morning he has a pinched in the top of his shoulder he states it goes away throughout the day.  He denies new injury or symptoms of pain.      No Known Allergies     Social History     Socioeconomic History    Marital status:    Tobacco Use    Smoking status: Never    Smokeless tobacco: Former     Types: Chew   Vaping Use    Vaping Use: Never used   Substance and Sexual Activity    Alcohol use: Yes     Alcohol/week: 1.0 standard drink     Types: 1 Cans of beer per week     Comment: rare    Drug use: Never    Sexual activity: Yes     Partners: Female     Birth control/protection: Surgical, Vasectomy        REVIEW OF SYSTEMS    Constitutional: Denies fevers, chills, weight loss  Cardiovascular: Denies chest pain, shortness of breath  Skin: Denies rashes, acute skin changes  Neurologic: Denies headache, loss of consciousness  MSK: Left shoulder pain      Objective   Vital Signs:   Pulse 75   Ht 180.3 cm (71\")   Wt 101 kg (222 lb)   SpO2 94%   BMI 30.96 kg/m²     Body mass index is 30.96 kg/m².    Physical Exam    Left shoulder: Nontender to palpation, no pain with range of motion, active forward elevation 180, active abduction 140, well-healed incisions, no erythema, no ecchymosis, no swelling or signs of infection, external rotation 90, internal rotation 70, 5 out of 5 strength, internal rotation to T10    Procedures    Imaging Results (Most Recent)       " None             Result Review :   The following data was reviewed by: OMER Wu on 06/13/2023:               Assessment and Plan    Diagnoses and all orders for this visit:    1. Aftercare following surgery of left shoulder arthroscopic rotator cuff debridment, labral debridement, distal clavicle excision, SAD, chondroplasty, 2/6/23 (Primary)        Discussed diagnosis and treatment options with the patient he was advised to continue activity and work as tolerated follow-up as needed, he agreed    Call or return if worsening symptoms.    Follow Up   Return if symptoms worsen or fail to improve.  Patient was given instructions and counseling regarding his condition or for health maintenance advice. Please see specific information pulled into the AVS if appropriate.

## 2024-09-07 ENCOUNTER — HOSPITAL ENCOUNTER (EMERGENCY)
Facility: HOSPITAL | Age: 59
Discharge: HOME | End: 2024-09-07
Attending: EMERGENCY MEDICINE
Payer: COMMERCIAL

## 2024-09-07 ENCOUNTER — APPOINTMENT (OUTPATIENT)
Dept: GENERAL RADIOLOGY | Facility: HOSPITAL | Age: 59
End: 2024-09-07
Payer: COMMERCIAL

## 2024-09-07 VITALS
BODY MASS INDEX: 30.81 KG/M2 | RESPIRATION RATE: 14 BRPM | OXYGEN SATURATION: 95 % | SYSTOLIC BLOOD PRESSURE: 126 MMHG | DIASTOLIC BLOOD PRESSURE: 86 MMHG | WEIGHT: 220.9 LBS | TEMPERATURE: 98.1 F | HEART RATE: 72 BPM

## 2024-09-07 DIAGNOSIS — S40.012A CONTUSION OF LEFT SHOULDER, INITIAL ENCOUNTER: Primary | ICD-10-CM

## 2024-09-07 PROCEDURE — 73030 X-RAY EXAM OF SHOULDER: CPT

## 2024-09-07 PROCEDURE — 99283 EMERGENCY DEPT VISIT LOW MDM: CPT

## 2024-09-07 RX ORDER — OXYCODONE AND ACETAMINOPHEN 5; 325 MG/1; MG/1
1 TABLET ORAL EVERY 6 HOURS PRN
Qty: 12 TABLET | Refills: 0 | Status: SHIPPED | OUTPATIENT
Start: 2024-09-07
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
## 2024-10-09 ENCOUNTER — CLINICAL SUPPORT (OUTPATIENT)
Dept: FAMILY MEDICINE CLINIC | Facility: CLINIC | Age: 59
End: 2024-10-09

## 2024-10-09 DIAGNOSIS — Z77.098 CHEMICAL EXPOSURE: Primary | ICD-10-CM

## 2024-11-15 ENCOUNTER — OFFICE VISIT (OUTPATIENT)
Dept: ORTHOPEDIC SURGERY | Facility: CLINIC | Age: 59
End: 2024-11-15
Payer: COMMERCIAL

## 2024-11-15 VITALS
SYSTOLIC BLOOD PRESSURE: 126 MMHG | HEIGHT: 71 IN | WEIGHT: 222.8 LBS | BODY MASS INDEX: 31.19 KG/M2 | HEART RATE: 75 BPM | OXYGEN SATURATION: 97 % | DIASTOLIC BLOOD PRESSURE: 89 MMHG

## 2024-11-15 DIAGNOSIS — M25.551 RIGHT HIP PAIN: Primary | ICD-10-CM

## 2024-11-15 DIAGNOSIS — M17.11 PRIMARY OSTEOARTHRITIS OF RIGHT KNEE: ICD-10-CM

## 2024-11-15 DIAGNOSIS — M16.11 PRIMARY OSTEOARTHRITIS OF RIGHT HIP: ICD-10-CM

## 2024-11-15 DIAGNOSIS — M85.60 SUBCHONDRAL BONE CYST: ICD-10-CM

## 2024-11-15 DIAGNOSIS — M25.561 RIGHT KNEE PAIN, UNSPECIFIED CHRONICITY: ICD-10-CM

## 2024-11-15 RX ORDER — LIDOCAINE HYDROCHLORIDE 10 MG/ML
5 INJECTION, SOLUTION INFILTRATION; PERINEURAL
Status: COMPLETED | OUTPATIENT
Start: 2024-11-15 | End: 2024-11-15

## 2024-11-15 RX ORDER — TRIAMCINOLONE ACETONIDE 40 MG/ML
40 INJECTION, SUSPENSION INTRA-ARTICULAR; INTRAMUSCULAR
Status: COMPLETED | OUTPATIENT
Start: 2024-11-15 | End: 2024-11-15

## 2024-11-15 RX ADMIN — LIDOCAINE HYDROCHLORIDE 5 ML: 10 INJECTION, SOLUTION INFILTRATION; PERINEURAL at 14:28

## 2024-11-15 RX ADMIN — TRIAMCINOLONE ACETONIDE 40 MG: 40 INJECTION, SUSPENSION INTRA-ARTICULAR; INTRAMUSCULAR at 14:28

## 2024-11-15 NOTE — PROGRESS NOTES
"Chief Complaint  Initial Evaluation and Pain of the Right Knee and Initial Evaluation and Pain of the Right Hip       Subjective      Patel Monreal presents to [facility] for an evaluation  of his right knee and right hip. His pain has been bothering him for about a month on his hip and since June on his right knee. He locates his pain to the groin. He denies any specific injury, trauma, falls or prior surgery. He states that he has been doing more walking when he noticed increase in pain to his knee.     Allergies   Allergen Reactions    Amoxicillin-Pot Clavulanate Rash        Social History     Socioeconomic History    Marital status:    Tobacco Use    Smoking status: Never    Smokeless tobacco: Former     Types: Chew   Vaping Use    Vaping status: Never Used   Substance and Sexual Activity    Alcohol use: Yes     Alcohol/week: 1.0 standard drink of alcohol     Types: 1 Cans of beer per week     Comment: rare    Drug use: Never    Sexual activity: Yes     Partners: Female     Birth control/protection: Surgical, Vasectomy        I reviewed the patient's chief complaint, history of present illness, review of systems, past medical history, surgical history, family history, social history, medications, and allergy list.     Review of Systems     Constitutional: Denies fevers, chills, weight loss  Cardiovascular: Denies chest pain, shortness of breath  Skin: Denies rashes, acute skin changes  Neurologic: Denies headache, loss of consciousness  MSK: Right hip and right knee pain       Vital Signs:   /89   Pulse 75   Ht 180.3 cm (71\")   Wt 101 kg (222 lb 12.8 oz)   SpO2 97%   BMI 31.07 kg/m²            Ortho Exam    Physical Exam  General:Alert. No acute distress     Right lower extremity: no effusion, no swelling, full extension, flexion to 130 degrees, stable to varus/valgus stress, stable to anterior/posterior drawer, non tender to the medial joint line  and lateral joint " line, negative  Lachman's, pain with Araseli's, hip flexion to 80 degrees, external rotation  to 30 degrees, internal rotation to 20 degrees, non tender to the lateral hip, negative  straight leg raise, negative  impingement, distal neurovascularly intact, calf soft, positive EHL, FHL, GS, and TA. Sensation intact to all 5 nerves of the foot.     Large Joint: R knee  Date/Time: 11/15/2024 2:28 PM  Consent given by: patient  Site marked: site marked  Timeout: Immediately prior to procedure a time out was called to verify the correct patient, procedure, equipment, support staff and site/side marked as required   Supporting Documentation  Indications: pain   Procedure Details  Location: knee - R knee  Preparation: Patient was prepped and draped in the usual sterile fashion  Needle gauge: 21 G.  Medications administered: 5 mL lidocaine 1 %; 40 mg triamcinolone acetonide 40 MG/ML  Patient tolerance: patient tolerated the procedure well with no immediate complications    This injection documentation was Scribed for Major Hickman MD by Irene Davison MA.  11/15/24   14:28 EST    X-Ray Report:  Right hip X-Ray  Indication: Evaluation of right hip pain   AP/Lateral view(s)  Findings: mild degenerative changes to the hip, no acute fracture, small subchondral cyst to the femoral head, 7 by 7 millimeters   Prior studies available for comparison: no     X-Ray Report:  Right knee X-Ray  Indication: Evaluation of right knee pain   AP/Lateral and Standing view(s)  Findings: moderately advanced degenerative changes to the right knee, no acute fracture or effusion,   Prior studies available for comparison: no       Imaging Results (Most Recent)       Procedure Component Value Units Date/Time    XR Hip With or Without Pelvis 2 - 3 View Right [[id number]] Resulted: 11/15/24 1354     Updated: 11/15/24 1401    XR Knee 3 View Right [[id number]] Resulted: 11/15/24 1354     Updated: 11/15/24 1401             Result Review :       No  results found.           Assessment and Plan     Diagnoses and all orders for this visit:    1. Right hip pain (Primary)  -     XR Hip With or Without Pelvis 2 - 3 View Right    2. Right knee pain, unspecified chronicity  -     XR Knee 3 View Right    3. Primary osteoarthritis of right knee    4. Primary osteoarthritis of right hip    5. Subchondral bone cyst      The patient presents here today for an evaluation  of his right hip and right knee. X-rays were obtained in the office today and these were reviewed today.     Discussed the risks and benefits of a right knee steroid injection today in the office. Patient expressed understanding and wishes to proceed. He tolerated the injection well and without any complications.     MRI order placed today on his right hip.     Home exercises given today and Diclofenac sent into the pharmacy today.     Call or return if worsening symptoms.    Follow Up     After MRI     Patient was given instructions and counseling regarding his condition or for health maintenance advice. Please see specific information pulled into the AVS if appropriate.     Scribed for Major Hickman MD by Patti Carreon.  11/15/24   14:01 EST    I have personally performed the services described in this document as scribed by the above individual and it is both accurate and complete. Major Hickman MD 11/15/24

## 2024-12-04 ENCOUNTER — OFFICE VISIT (OUTPATIENT)
Dept: GASTROENTEROLOGY | Facility: CLINIC | Age: 59
End: 2024-12-04
Payer: COMMERCIAL

## 2024-12-04 VITALS
DIASTOLIC BLOOD PRESSURE: 82 MMHG | WEIGHT: 221 LBS | BODY MASS INDEX: 30.94 KG/M2 | HEIGHT: 71 IN | SYSTOLIC BLOOD PRESSURE: 136 MMHG | HEART RATE: 78 BPM

## 2024-12-04 DIAGNOSIS — R11.10 REGURGITATION OF FOOD: ICD-10-CM

## 2024-12-04 DIAGNOSIS — K21.9 GASTROESOPHAGEAL REFLUX DISEASE, UNSPECIFIED WHETHER ESOPHAGITIS PRESENT: Primary | ICD-10-CM

## 2024-12-04 DIAGNOSIS — Z86.0101 HISTORY OF ADENOMATOUS POLYP OF COLON: ICD-10-CM

## 2024-12-04 DIAGNOSIS — K57.90 DIVERTICULOSIS: ICD-10-CM

## 2024-12-04 PROCEDURE — 99214 OFFICE O/P EST MOD 30 MIN: CPT | Performed by: NURSE PRACTITIONER

## 2024-12-04 NOTE — PATIENT INSTRUCTIONS
Start daily omeprazole 20 mg (take before a meal)     Give me update in 1-2 weeks (MYCHART me or call office)

## 2024-12-04 NOTE — PROGRESS NOTES
"Chief Complaint   Heartburn    History of Present Illness       Patel Monreal is a 59 y.o. male who presents to [facility] for follow-up for Heartburn.  He is new to me today.  He was last seen in the office by nurse practitioner Spencer Taylor on 7/21/2022.    Has been having more reflux lately especially at night. Taking TUMS. On ibuprofen PRN. Denies any dysphagia. Bowels moving well with miralax daily.     He underwent colonoscopy with Dr. Garza on 12/16/2022.  Colonoscopy showed nonbleeding internal/external hemorrhoids.  Mild diverticulosis.  4 mm ascending colon polyp was removed.  A 4 mm transverse colon polyp was removed.  Path positive for sessile serrated lesion.  Repeat colonoscopy in 5 years.    EGD---never had     GI family history----Father with colon issues?   Results       Result Review :                   Lipase   Lipase   Date Value Ref Range Status   07/04/2022 36 13 - 60 U/L Final     Amylase No results found for: \"AMYLASE\"  Iron Profile No results found for: \"IRON\", \"TIBC\", \"LABIRON\", \"TRANSFERRIN\"  Ferritin No results found for: \"FERRITIN\"  ESR (Sed Rate) No results found for: \"SEDRATE\"  CRP (C-Reactive) No results found for: \"CRP\"  Liver Workup No results found for: \"AFPTM\", \"DSDNA\", \"EXPANDEDENA\", \"SMOOTHMUSCAB\", \"CERULOPLSM\", \"FERRITIN\", \"LABIMMURE\", \"TOTIGGREF\", \"IGA\", \"IGM\", \"IRON\", \"TIBC\", \"LABIRON\", \"TRANSFERRIN\", \"MITOAB\", \"PROTIME\", \"INR\", \"AFP\"            Past Medical History       Past Medical History:   Diagnosis Date    Diverticulitis of colon 2022    Rotator cuff syndrome 09/15/2022    Rotator cuff tendonitis, left     Urinary frequency        Past Surgical History:   Procedure Laterality Date    COLONOSCOPY      COLONOSCOPY N/A 12/16/2022    Procedure: COLONOSCOPY WITH POLYPECTOMIES;  Surgeon: Saniya Garza MD;  Location: [facility];  Service: Gastroenterology;  Laterality: N/A;  COLON POLYPS, DIVERTICULOSIS    HAND SURGERY      " "Trigger finger    KNEE ARTHROSCOPY Bilateral     KNEE SURGERY  ?    SHOULDER ARTHROSCOPY Right     SHOULDER ARTHROSCOPY W/ ROTATOR CUFF REPAIR Left 02/06/2023    Procedure: SHOULDER ARTHROSCOPY WITH ROTATOR CUFF AND LABERAL DEBRIDMENT, DISTAL CLAVICLE EXCISION, AND SUBCROMIAL DECOMPRESSION;  Surgeon: Major Hickman MD;  Location: [facility];  Service: Orthopedics;  Laterality: Left;    SHOULDER SURGERY  ?    Bicep tear    TONSILLECTOMY      TRIGGER FINGER RELEASE Right     TRIGGER POINT INJECTION      Had surgery         Current Outpatient Medications:     aspirin 81 MG EC tablet, 1 tablet Daily. TAKES FOR HEART HEALTH REPORTED LAST DOSE TAKEN WAS  1/15/23, Disp: , Rfl:     ibuprofen (ADVIL,MOTRIN) 800 MG tablet, Take 1 tablet by mouth 2 (Two) Times a Day., Disp: 60 tablet, Rfl: 2    omeprazole (priLOSEC) 20 MG capsule, Take 1 capsule by mouth Daily., Disp: 30 capsule, Rfl: 5     Allergies   Allergen Reactions    Amoxicillin-Pot Clavulanate Rash       Family History   Problem Relation Age of Onset    Cancer Father         Prostrate cancer    Malig Hyperthermia Neg Hx         Social History     Social History Narrative    Not on file       Objective       Review of Systems   Constitutional:  Negative for appetite change, fatigue, fever, unexpected weight gain and unexpected weight loss.   HENT:  Negative for trouble swallowing.    Respiratory:  Negative for cough, choking, chest tightness, shortness of breath, wheezing and stridor.    Cardiovascular:  Negative for chest pain, palpitations and leg swelling.   Gastrointestinal:  Positive for GERD and indigestion. Negative for abdominal distention, abdominal pain, anal bleeding, blood in stool, constipation, diarrhea, nausea, rectal pain and vomiting.        Vital Signs:   /82 (BP Location: Right arm, Patient Position: Sitting, Cuff Size: Adult)   Pulse 78   Ht 180.3 cm (71\")   Wt 100 kg (221 lb)   BMI 30.82 kg/m²       Physical Exam  Constitutional:  "      General: He is not in acute distress.     Appearance: He is well-developed. He is not ill-appearing.   HENT:      Head: Normocephalic.   Eyes:      Pupils: Pupils are equal, round, and reactive to light.   Cardiovascular:      Rate and Rhythm: Normal rate and regular rhythm.      Heart sounds: Normal heart sounds.   Pulmonary:      Effort: Pulmonary effort is normal.      Breath sounds: Normal breath sounds.   Abdominal:      General: Bowel sounds are normal. There is no distension.      Palpations: Abdomen is soft. There is no mass.      Tenderness: There is no abdominal tenderness. There is no guarding or rebound.      Hernia: No hernia is present.   Musculoskeletal:         General: Normal range of motion.   Skin:     General: Skin is warm and dry.   Neurological:      Mental Status: He is alert and oriented to person, place, and time.   Psychiatric:         Speech: Speech normal.         Behavior: Behavior normal.         Judgment: Judgment normal.           Assessment & Plan          Assessment and Plan    Diagnoses and all orders for this visit:    1. Gastroesophageal reflux disease, unspecified whether esophagitis present (Primary)  -     omeprazole (priLOSEC) 20 MG capsule; Take 1 capsule by mouth Daily.  Dispense: 30 capsule; Refill: 5    2. Regurgitation of food    3. Diverticulosis    4. History of adenomatous polyp of colon      Reviewed medical history with him today.  GERD does not sound well-controlled at this time.  Will start omeprazole 20 mg daily.  Continue GERD precautions.  We did talk about possibly doing an EGD for further evaluation.  He is agreeable to hold off at this time and try the medicine first.  Good appetite.  Bowels moving well.  Continue high-fiber diet.  Next screening colonoscopy due in 2027.  Patient to give me an update in 2 weeks.  Patient to follow-up in 3 months.  Patient is agreeable to the plan.          Follow Up       Follow Up   Return in about 3 months (around  3/4/2025) for GERD.  Patient was given instructions and counseling regarding his condition or for health maintenance advice. Please see specific information pulled into the AVS if appropriate.

## 2024-12-16 ENCOUNTER — HOSPITAL ENCOUNTER (OUTPATIENT)
Dept: MRI IMAGING | Facility: HOSPITAL | Age: 59
Discharge: HOME | End: 2024-12-16
Admitting: ORTHOPAEDIC SURGERY
Payer: COMMERCIAL

## 2024-12-16 DIAGNOSIS — M16.11 PRIMARY OSTEOARTHRITIS OF RIGHT HIP: ICD-10-CM

## 2024-12-16 DIAGNOSIS — M25.551 RIGHT HIP PAIN: ICD-10-CM

## 2024-12-16 PROCEDURE — 73721 MRI JNT OF LWR EXTRE W/O DYE: CPT

## 2024-12-19 ENCOUNTER — OFFICE VISIT (OUTPATIENT)
Dept: ORTHOPEDIC SURGERY | Facility: CLINIC | Age: 59
End: 2024-12-19
Payer: COMMERCIAL

## 2024-12-19 ENCOUNTER — PREP FOR SURGERY (OUTPATIENT)
Dept: OTHER | Facility: HOSPITAL | Age: 59
End: 2024-12-19
Payer: COMMERCIAL

## 2024-12-19 VITALS
HEART RATE: 76 BPM | OXYGEN SATURATION: 94 % | BODY MASS INDEX: 30.94 KG/M2 | HEIGHT: 71 IN | DIASTOLIC BLOOD PRESSURE: 98 MMHG | SYSTOLIC BLOOD PRESSURE: 153 MMHG | WEIGHT: 221 LBS

## 2024-12-19 DIAGNOSIS — M17.11 PRIMARY OSTEOARTHRITIS OF RIGHT KNEE: ICD-10-CM

## 2024-12-19 DIAGNOSIS — M17.11 PRIMARY OSTEOARTHRITIS OF RIGHT KNEE: Primary | ICD-10-CM

## 2024-12-19 DIAGNOSIS — M16.11 PRIMARY OSTEOARTHRITIS OF RIGHT HIP: Primary | ICD-10-CM

## 2024-12-19 RX ORDER — ALFUZOSIN HYDROCHLORIDE 10 MG/1
1 TABLET, EXTENDED RELEASE ORAL DAILY
COMMUNITY

## 2024-12-19 RX ORDER — TRANEXAMIC ACID 10 MG/ML
1000 INJECTION, SOLUTION INTRAVENOUS ONCE
OUTPATIENT
Start: 2024-12-19 | End: 2024-12-19

## 2024-12-19 NOTE — PROGRESS NOTES
"Chief Complaint  Follow-up of the Right Hip       Subjective      Patel Monreal presents to [facility] for a follow up for his right hip. He was last seen in the office on 11/15/24 where we ordered an MRI on his right hip. He presents today for these results. His pain has been bothering him for about a month on his hip. He locates his pain to the groin. He denies any specific injury, trauma, falls or prior surgery. He states that he has been doing more walking when he noticed increase in pain. During his last visit he had a right knee steroid injection. He reports this injection didn't give him much relief and locates his pain to the medial aspect of his knee. He has pain with prolonged walking and going up and down the stairs.     Allergies   Allergen Reactions    Amoxicillin-Pot Clavulanate Rash        Social History     Socioeconomic History    Marital status:    Tobacco Use    Smoking status: Never    Smokeless tobacco: Former     Types: Chew   Vaping Use    Vaping status: Never Used   Substance and Sexual Activity    Alcohol use: Yes     Alcohol/week: 1.0 standard drink of alcohol     Types: 1 Cans of beer per week     Comment: rare    Drug use: Never    Sexual activity: Yes     Partners: Female     Birth control/protection: Vasectomy, Surgical        I reviewed the patient's chief complaint, history of present illness, review of systems, past medical history, surgical history, family history, social history, medications, and allergy list.     Review of Systems     Constitutional: Denies fevers, chills, weight loss  Cardiovascular: Denies chest pain, shortness of breath  Skin: Denies rashes, acute skin changes  Neurologic: Denies headache, loss of consciousness  MSK: Right hip pain       Vital Signs:   /98   Pulse 76   Ht 180.3 cm (70.98\")   Wt 100 kg (221 lb)   SpO2 94%   BMI 30.84 kg/m²            Ortho Exam    Physical Exam  General:Alert. No acute distress "     Right lower extremity: no effusion, no swelling, full extension, flexion to 130 degrees, stable to varus/valgus stress, stable to anterior/posterior drawer, non tender to the medial joint line and lateral joint line, negative Lachman's, pain with Araseli's, hip flexion to 80 degrees, external rotation to 30 degrees, internal rotation to 20 degrees, non tender to the lateral hip, negative straight leg raise, negative impingement, distal neurovascularly intact, calf soft, positive EHL, FHL, GS, and TA. Sensation intact to all 5 nerves of the foot.     Procedures    Imaging Results (Most Recent)       None             Result Review :       MRI Hip Right Without Contrast    Result Date: 12/18/2024  Narrative: MRI HIP RIGHT WO CONTRAST Date of Exam: 12/16/2024 1:07 PM EST Indication: right hip pain.  Comparison: None available. Technique:  Routine multiplanar/multisequence images of the right hip were obtained without contrast administration.  Findings: There is no evidence of acute fracture or avascular necrosis. Normal pelvic bone alignment. Mild bilateral SI joint and pubic symphysis degenerative change. No suspicious bone lesion. Partially visualized lumbar spondylosis. There may be mild degenerative tearing of the superior acetabular labrum which is nondisplaced. No evidence of paralabral cyst formation. There is focal high-grade partial-thickness weightbearing articular cartilage loss of the superior lateral aspect of  the hip with underlying subchondral edema and developing cyst formation is seen on series 12 image 13 and series 13 image 16. The majority of the weightbearing articular cartilage is intact and there is no significant joint effusion. There is distal right gluteal tendinopathy with high signal at the insertion of the right gluteus minimus tendon on the anterior facet as seen on series 11 image 36 and series 14 image 20. This likely represents high-grade partial-thickness nondisplaced tearing.  There is mild adjacent edema within the greater trochanter. No significant fatty muscle atrophy. Incidentally noted mild left gluteal tendinopathy without evidence of tear. The bilateral iliopsoas, adductor, and hamstrings myotendinous structures are intact. The included intrapelvic structures show no acute abnormality. There is colonic diverticulosis without evidence of diverticulitis.     Impression: Impression: 1.There is distal right gluteal tendinopathy with high signal at the insertion of the right gluteus minimus tendon on the anterior facet as seen and likely represents high-grade partial-thickness nondisplaced tearing. There is mild adjacent edema within the greater trochanter. 2.Focal high-grade partial-thickness weightbearing articular cartilage loss of the superior lateral aspect of the hip with underlying subchondral edema and developing cyst formation. 3.Additional findings as above. No evidence of acute fracture or avascular necrosis. Electronically Signed: Emmanuel Monreal MD  12/18/2024 3:52 PM EST  Workstation ID: [id number]            Assessment and Plan     Diagnoses and all orders for this visit:    1. Primary osteoarthritis of right hip (Primary)    2. Primary osteoarthritis of right knee        The patient presents here today for a follow up for a follow up for his right hip. MRI results were discussed and reviewed with the patient.      Discussed operative treatment options regarding a right knee arthroplasty with kourtney versus non operative treatment options regarding injections, medications and therapy.     Patient wishes to proceed with operative treatment options. Risks and benefits were discussed and reviewed with the patient today in the office. Patient expressed understanding and wishes to proceed.     He will continue home exercises and current medications for pain control.      Discussed surgery., Discussed with patient the implant type being used during surgery and patient understands.,  Surgery pamphlet given., Call or return if worsening symptoms., DME order for a 3 in 1 given today due to patient will be confined to one room/level of the home that does not offer a toilet during postop recovery. , I am ordering the use of the Nice1 cold therapy machine for 60 days post-op as part of an opioid-sparing approach to help manage pain and edema.  I feel this is medically necessary for the best care for this patient.   , and Patient does not have any metal allergies.     Follow Up     2 weeks post-operatively      Will obtain X-Rays of right knee at next visit.       Patient was given instructions and counseling regarding his condition or for health maintenance advice. Please see specific information pulled into the AVS if appropriate.     Scribed for Major Hickman MD by Patti Carreon.  12/19/24   08:04 EST    I have personally performed the services described in this document as scribed by the above individual and it is both accurate and complete. Major Hickman MD 12/19/24

## 2025-03-06 ENCOUNTER — OFFICE VISIT (OUTPATIENT)
Dept: GASTROENTEROLOGY | Facility: CLINIC | Age: 60
End: 2025-03-06
Payer: COMMERCIAL

## 2025-03-06 VITALS
SYSTOLIC BLOOD PRESSURE: 129 MMHG | DIASTOLIC BLOOD PRESSURE: 82 MMHG | HEART RATE: 84 BPM | WEIGHT: 222 LBS | HEIGHT: 71 IN | BODY MASS INDEX: 31.08 KG/M2

## 2025-03-06 DIAGNOSIS — R11.10 REGURGITATION OF FOOD: ICD-10-CM

## 2025-03-06 DIAGNOSIS — K21.9 GASTROESOPHAGEAL REFLUX DISEASE, UNSPECIFIED WHETHER ESOPHAGITIS PRESENT: Primary | ICD-10-CM

## 2025-03-06 DIAGNOSIS — K57.90 DIVERTICULOSIS: ICD-10-CM

## 2025-03-06 DIAGNOSIS — Z86.0100 HISTORY OF COLON POLYPS: ICD-10-CM

## 2025-03-06 PROCEDURE — 99214 OFFICE O/P EST MOD 30 MIN: CPT | Performed by: NURSE PRACTITIONER

## 2025-03-06 RX ORDER — OMEPRAZOLE 40 MG/1
40 CAPSULE, DELAYED RELEASE ORAL DAILY
Qty: 90 CAPSULE | Refills: 1 | Status: SHIPPED | OUTPATIENT
Start: 2025-03-06

## 2025-03-06 NOTE — PROGRESS NOTES
"Chief Complaint   Heartburn    History of Present Illness       Patel Monreal is a 59 y.o. male who presents to [facility] for follow-up for GERD. He was last seen in the office by me on     Has been having more reflux lately especially at night. Taking TUMS. On ibuprofen PRN. Denies any dysphagia. Bowels moving well with miralax daily.      He underwent colonoscopy with Dr. Garza on 12/16/2022.  Colonoscopy showed nonbleeding internal/external hemorrhoids.  Mild diverticulosis.  4 mm ascending colon polyp was removed.  A 4 mm transverse colon polyp was removed.  Path positive for sessile serrated lesion.  Repeat colonoscopy in 5 years.     EGD---never had      GI family history----Father with colon issues?     He is getting right knee replacement the 23rd of this month. Has been using NSAIDS. GERD is better with omeprazole 20 mg daily but not great. He admits he is still having reflux flare ups. Worse at night.   Results       Result Review :                   Lipase   Lipase   Date Value Ref Range Status   07/04/2022 36 13 - 60 U/L Final     Amylase No results found for: \"AMYLASE\"  Iron Profile No results found for: \"IRON\", \"TIBC\", \"LABIRON\", \"TRANSFERRIN\"  Ferritin No results found for: \"FERRITIN\"  ESR (Sed Rate) No results found for: \"SEDRATE\"  CRP (C-Reactive) No results found for: \"CRP\"  Liver Workup No results found for: \"AFPTM\", \"DSDNA\", \"EXPANDEDENA\", \"SMOOTHMUSCAB\", \"CERULOPLSM\", \"FERRITIN\", \"LABIMMURE\", \"TOTIGGREF\", \"IGA\", \"IGM\", \"IRON\", \"TIBC\", \"LABIRON\", \"TRANSFERRIN\", \"MITOAB\", \"PROTIME\", \"INR\", \"AFP\"            Past Medical History       Past Medical History:   Diagnosis Date    Diverticulitis of colon 2022    GERD (gastroesophageal reflux disease) 4/2024    Rotator cuff syndrome 09/15/2022    Rotator cuff tendonitis, left     Urinary frequency        Past Surgical History:   Procedure Laterality Date    COLONOSCOPY      COLONOSCOPY N/A 12/16/2022    Procedure: " COLONOSCOPY WITH POLYPECTOMIES;  Surgeon: Saniya Garza MD;  Location: [facility] ENDOSCOPY;  Service: Gastroenterology;  Laterality: N/A;  COLON POLYPS, DIVERTICULOSIS    HAND SURGERY      Trigger finger    KNEE ARTHROSCOPY Bilateral     KNEE SURGERY  ?    SHOULDER ARTHROSCOPY Right     SHOULDER ARTHROSCOPY W/ ROTATOR CUFF REPAIR Left 02/06/2023    Procedure: SHOULDER ARTHROSCOPY WITH ROTATOR CUFF AND LABERAL DEBRIDMENT, DISTAL CLAVICLE EXCISION, AND SUBCROMIAL DECOMPRESSION;  Surgeon: Major Hickman MD;  Location: [facility] OR [facility];  Service: Orthopedics;  Laterality: Left;    SHOULDER SURGERY  ?    Bicep tear    TONSILLECTOMY      TRIGGER FINGER RELEASE Right     TRIGGER POINT INJECTION      Had surgery         Current Outpatient Medications:     alfuzosin (UROXATRAL) 10 MG 24 hr tablet, Take 1 tablet by mouth Daily., Disp: , Rfl:     aspirin 81 MG EC tablet, 1 tablet Daily. TAKES FOR HEART HEALTH REPORTED LAST DOSE TAKEN WAS  1/15/23, Disp: , Rfl:     ibuprofen (ADVIL,MOTRIN) 800 MG tablet, Take 1 tablet by mouth 2 (Two) Times a Day., Disp: 60 tablet, Rfl: 2    omeprazole (priLOSEC) 40 MG capsule, Take 1 capsule by mouth Daily., Disp: 90 capsule, Rfl: 1     Allergies   Allergen Reactions    Amoxicillin-Pot Clavulanate Rash       Family History   Problem Relation Age of Onset    Cancer Father         Prostrate cancer    Malig Hyperthermia Neg Hx         Social History     Social History Narrative    Not on file       Objective       Review of Systems   Constitutional:  Negative for appetite change, fatigue, fever, unexpected weight gain and unexpected weight loss.   HENT:  Negative for trouble swallowing.    Respiratory:  Negative for cough, choking, chest tightness, shortness of breath, wheezing and stridor.    Cardiovascular:  Negative for chest pain, palpitations and leg swelling.   Gastrointestinal:  Positive for abdominal distention, GERD and indigestion. Negative for abdominal pain, anal bleeding,  "blood in stool, constipation, diarrhea, nausea, rectal pain and vomiting.        Vital Signs:   /82   Pulse 84   Ht 180.3 cm (70.98\")   Wt 101 kg (222 lb)   BMI 30.98 kg/m²       Physical Exam  Constitutional:       General: He is not in acute distress.     Appearance: He is well-developed. He is not ill-appearing.   HENT:      Head: Normocephalic.   Eyes:      Pupils: Pupils are equal, round, and reactive to light.   Cardiovascular:      Rate and Rhythm: Normal rate and regular rhythm.      Heart sounds: Normal heart sounds.   Pulmonary:      Effort: Pulmonary effort is normal.      Breath sounds: Normal breath sounds.   Abdominal:      General: Bowel sounds are normal. There is no distension.      Palpations: Abdomen is soft. There is no mass.      Tenderness: There is no abdominal tenderness. There is no guarding or rebound.      Hernia: No hernia is present.   Musculoskeletal:         General: Normal range of motion.   Skin:     General: Skin is warm and dry.   Neurological:      Mental Status: He is alert and oriented to person, place, and time.   Psychiatric:         Speech: Speech normal.         Behavior: Behavior normal.         Judgment: Judgment normal.           Assessment & Plan          Assessment and Plan    Diagnoses and all orders for this visit:    1. Gastroesophageal reflux disease, unspecified whether esophagitis present (Primary)  -     omeprazole (priLOSEC) 40 MG capsule; Take 1 capsule by mouth Daily.  Dispense: 90 capsule; Refill: 1    2. Regurgitation of food    3. Diverticulosis    4. History of colon polyps      GERD is definitely better on omeprazole 20 mg daily.  Still having breakthrough reflux worse at night.  Will increase the omeprazole to 40 mg daily and see how he does.  May have to add Pepcid at bedtime.  He wants to wait on EGD because he is getting ready to have knee replacement surgery at the end of this month.  Continue GERD precautions.  Patient to give me an update " in 2 weeks.  Patient to follow-up with me in 2 months.  Patient is agreeable to the plan.          Follow Up       Follow Up   Return in about 2 months (around 5/6/2025) for GERD.  Patient was given instructions and counseling regarding his condition or for health maintenance advice. Please see specific information pulled into the AVS if appropriate.

## 2025-03-12 ENCOUNTER — TELEPHONE (OUTPATIENT)
Dept: ORTHOPEDIC SURGERY | Facility: CLINIC | Age: 60
End: 2025-03-12
Payer: COMMERCIAL

## 2025-03-12 NOTE — TELEPHONE ENCOUNTER
RECEIVED A VMAIL FROM PATIENT STATING HE WISHES TO CX SX FOR 4/23/2025 AND CX PRE-OP ON 4/15/2025 AND DOES NOT WISH TO RESCHEDULE.     ATTEMPTED TO RETURN PATIENT'S CALL, NO ANSWER, LVM FOR PATIENT STATING SX AND PRE-OP APPT'S HAVE BEEN CX'D AND IF HE WISHES TO RS IN THE FUTURE TO GIVE OUR OFFICE A CALL.     SPOKE WITH ELIE IN SCHEDULING TO CX SX FOR 4/23/2025.

## 2025-03-24 ENCOUNTER — HOSPITAL ENCOUNTER (OUTPATIENT)
Dept: CARDIOLOGY | Facility: HOSPITAL | Age: 60
Discharge: HOME | End: 2025-03-24
Payer: COMMERCIAL

## 2025-03-24 ENCOUNTER — TRANSCRIBE ORDERS (OUTPATIENT)
Facility: HOSPITAL | Age: 60
End: 2025-03-24
Payer: COMMERCIAL

## 2025-03-24 ENCOUNTER — LAB (OUTPATIENT)
Facility: HOSPITAL | Age: 60
End: 2025-03-24
Payer: COMMERCIAL

## 2025-03-24 DIAGNOSIS — M17.11 ARTHRITIS OF RIGHT KNEE: Primary | ICD-10-CM

## 2025-03-24 DIAGNOSIS — M17.11 ARTHRITIS OF RIGHT KNEE: ICD-10-CM

## 2025-03-24 LAB
ANION GAP SERPL CALCULATED.3IONS-SCNC: 13 MMOL/L (ref 5–15)
BASOPHILS # BLD AUTO: 0.06 10*3/MM3 (ref 0–0.2)
BASOPHILS NFR BLD AUTO: 0.7 % (ref 0–1.5)
BUN SERPL-MCNC: 20 MG/DL (ref 6–20)
BUN/CREAT SERPL: 14.4 (ref 7–25)
CALCIUM SPEC-SCNC: 9.5 MG/DL (ref 8.6–10.5)
CHLORIDE SERPL-SCNC: 105 MMOL/L (ref 98–107)
CO2 SERPL-SCNC: 21 MMOL/L (ref 22–29)
CREAT SERPL-MCNC: 1.39 MG/DL (ref 0.76–1.27)
DEPRECATED RDW RBC AUTO: 38.2 FL (ref 37–54)
EGFRCR SERPLBLD CKD-EPI 2021: 58.4 ML/MIN/1.73
EOSINOPHIL # BLD AUTO: 0.19 10*3/MM3 (ref 0–0.4)
EOSINOPHIL NFR BLD AUTO: 2.1 % (ref 0.3–6.2)
ERYTHROCYTE [DISTWIDTH] IN BLOOD BY AUTOMATED COUNT: 12.1 % (ref 12.3–15.4)
GLUCOSE SERPL-MCNC: 93 MG/DL (ref 65–99)
HCT VFR BLD AUTO: 45.4 % (ref 37.5–51)
HGB BLD-MCNC: 15 G/DL (ref 13–17.7)
IMM GRANULOCYTES # BLD AUTO: 0.05 10*3/MM3 (ref 0–0.05)
IMM GRANULOCYTES NFR BLD AUTO: 0.6 % (ref 0–0.5)
LYMPHOCYTES # BLD AUTO: 2.06 10*3/MM3 (ref 0.7–3.1)
LYMPHOCYTES NFR BLD AUTO: 22.7 % (ref 19.6–45.3)
MCH RBC QN AUTO: 28.5 PG (ref 26.6–33)
MCHC RBC AUTO-ENTMCNC: 33 G/DL (ref 31.5–35.7)
MCV RBC AUTO: 86.1 FL (ref 79–97)
MONOCYTES # BLD AUTO: 0.78 10*3/MM3 (ref 0.1–0.9)
MONOCYTES NFR BLD AUTO: 8.6 % (ref 5–12)
NEUTROPHILS NFR BLD AUTO: 5.92 10*3/MM3 (ref 1.7–7)
NEUTROPHILS NFR BLD AUTO: 65.3 % (ref 42.7–76)
NRBC BLD AUTO-RTO: 0 /100 WBC (ref 0–0.2)
PLATELET # BLD AUTO: 231 10*3/MM3 (ref 140–450)
PMV BLD AUTO: 10.4 FL (ref 6–12)
POTASSIUM SERPL-SCNC: 3.9 MMOL/L (ref 3.5–5.2)
QT INTERVAL: 378 MS
QTC INTERVAL: 464 MS
RBC # BLD AUTO: 5.27 10*6/MM3 (ref 4.14–5.8)
SODIUM SERPL-SCNC: 139 MMOL/L (ref 136–145)
WBC NRBC COR # BLD AUTO: 9.06 10*3/MM3 (ref 3.4–10.8)

## 2025-03-24 PROCEDURE — 93005 ELECTROCARDIOGRAM TRACING: CPT | Performed by: ORTHOPAEDIC SURGERY

## 2025-03-24 PROCEDURE — 85025 COMPLETE CBC W/AUTO DIFF WBC: CPT

## 2025-03-24 PROCEDURE — 80048 BASIC METABOLIC PNL TOTAL CA: CPT

## 2025-03-24 PROCEDURE — 36415 COLL VENOUS BLD VENIPUNCTURE: CPT

## 2025-03-24 PROCEDURE — 93010 ELECTROCARDIOGRAM REPORT: CPT | Performed by: INTERNAL MEDICINE

## 2025-04-14 ENCOUNTER — TELEPHONE (OUTPATIENT)
Dept: GASTROENTEROLOGY | Facility: CLINIC | Age: 60
End: 2025-04-14
Payer: COMMERCIAL

## 2025-04-14 NOTE — TELEPHONE ENCOUNTER
Spoke with patient regarding rescheduling appointment with Nilda Gordon on 5/29/25, due to provider being out of the office. He has rescheduled his appointment to 5/27/25@8/:30.

## 2025-04-22 ENCOUNTER — TELEPHONE (OUTPATIENT)
Age: 60
End: 2025-04-22

## 2025-04-22 NOTE — TELEPHONE ENCOUNTER
Caller: CHAGO GOOD    Relationship: Emergency Contact    Best call back number: [phone]     What was the call regarding: PT WIFE CALLING TO FU ON REFERRAL IN QUEUE - ADVISED IT WAS IN REVIEW AND ACTIVELY BEING WORKED ON - SHE VERBALIZED UNDERSTANDING AND WILL REACH OUT IF IT GOES PAST 48 HOURS -

## 2025-05-21 ENCOUNTER — HOSPITAL ENCOUNTER (EMERGENCY)
Facility: HOSPITAL | Age: 60
Discharge: HOME | End: 2025-05-21
Attending: EMERGENCY MEDICINE | Admitting: EMERGENCY MEDICINE
Payer: COMMERCIAL

## 2025-05-21 ENCOUNTER — APPOINTMENT (OUTPATIENT)
Dept: GENERAL RADIOLOGY | Facility: HOSPITAL | Age: 60
End: 2025-05-21
Payer: COMMERCIAL

## 2025-05-21 VITALS
HEART RATE: 86 BPM | DIASTOLIC BLOOD PRESSURE: 98 MMHG | WEIGHT: 209 LBS | SYSTOLIC BLOOD PRESSURE: 137 MMHG | TEMPERATURE: 97.8 F | RESPIRATION RATE: 16 BRPM | HEIGHT: 70 IN | BODY MASS INDEX: 29.92 KG/M2 | OXYGEN SATURATION: 96 %

## 2025-05-21 DIAGNOSIS — Z96.641 STATUS POST TOTAL REPLACEMENT OF RIGHT HIP: ICD-10-CM

## 2025-05-21 DIAGNOSIS — I95.81 POSTPROCEDURAL HYPOTENSION: ICD-10-CM

## 2025-05-21 DIAGNOSIS — R26.2 DIFFICULTY WALKING: Primary | ICD-10-CM

## 2025-05-21 LAB
ALBUMIN SERPL-MCNC: 4.2 G/DL (ref 3.5–5.2)
ALBUMIN/GLOB SERPL: 1.8 G/DL
ALP SERPL-CCNC: 66 U/L (ref 39–117)
ALT SERPL W P-5'-P-CCNC: 22 U/L (ref 1–41)
ANION GAP SERPL CALCULATED.3IONS-SCNC: 12.2 MMOL/L (ref 5–15)
AST SERPL-CCNC: 24 U/L (ref 1–40)
BASOPHILS # BLD AUTO: 0.02 10*3/MM3 (ref 0–0.2)
BASOPHILS NFR BLD AUTO: 0.2 % (ref 0–1.5)
BILIRUB SERPL-MCNC: 0.3 MG/DL (ref 0–1.2)
BILIRUB UR QL STRIP: NEGATIVE
BUN SERPL-MCNC: 24 MG/DL (ref 8–23)
BUN/CREAT SERPL: 23.5 (ref 7–25)
CALCIUM SPEC-SCNC: 9.5 MG/DL (ref 8.6–10.5)
CHLORIDE SERPL-SCNC: 106 MMOL/L (ref 98–107)
CK SERPL-CCNC: 416 U/L (ref 20–200)
CLARITY UR: ABNORMAL
CO2 SERPL-SCNC: 20.8 MMOL/L (ref 22–29)
COLOR UR: ABNORMAL
CREAT SERPL-MCNC: 1.02 MG/DL (ref 0.76–1.27)
D-LACTATE SERPL-SCNC: 1.7 MMOL/L (ref 0.5–2)
D-LACTATE SERPL-SCNC: 3 MMOL/L (ref 0.5–2)
DEPRECATED RDW RBC AUTO: 40.4 FL (ref 37–54)
EGFRCR SERPLBLD CKD-EPI 2021: 84.1 ML/MIN/1.73
EOSINOPHIL # BLD AUTO: 0 10*3/MM3 (ref 0–0.4)
EOSINOPHIL NFR BLD AUTO: 0 % (ref 0.3–6.2)
ERYTHROCYTE [DISTWIDTH] IN BLOOD BY AUTOMATED COUNT: 13 % (ref 12.3–15.4)
GEN 5 1HR TROPONIN T REFLEX: <6 NG/L
GLOBULIN UR ELPH-MCNC: 2.3 GM/DL
GLUCOSE BLDC GLUCOMTR-MCNC: 290 MG/DL (ref 70–99)
GLUCOSE SERPL-MCNC: 198 MG/DL (ref 65–99)
GLUCOSE UR STRIP-MCNC: NEGATIVE MG/DL
HCT VFR BLD AUTO: 36.8 % (ref 37.5–51)
HGB BLD-MCNC: 12.6 G/DL (ref 13–17.7)
HGB UR QL STRIP.AUTO: NEGATIVE
HOLD SPECIMEN: NORMAL
HOLD SPECIMEN: NORMAL
IMM GRANULOCYTES # BLD AUTO: 0.06 10*3/MM3 (ref 0–0.05)
IMM GRANULOCYTES NFR BLD AUTO: 0.5 % (ref 0–0.5)
KETONES UR QL STRIP: ABNORMAL
LEUKOCYTE ESTERASE UR QL STRIP.AUTO: NEGATIVE
LYMPHOCYTES # BLD AUTO: 0.31 10*3/MM3 (ref 0.7–3.1)
LYMPHOCYTES NFR BLD AUTO: 2.6 % (ref 19.6–45.3)
MAGNESIUM SERPL-MCNC: 2.2 MG/DL (ref 1.6–2.4)
MCH RBC QN AUTO: 29.4 PG (ref 26.6–33)
MCHC RBC AUTO-ENTMCNC: 34.2 G/DL (ref 31.5–35.7)
MCV RBC AUTO: 86 FL (ref 79–97)
MONOCYTES # BLD AUTO: 0.2 10*3/MM3 (ref 0.1–0.9)
MONOCYTES NFR BLD AUTO: 1.7 % (ref 5–12)
NEUTROPHILS NFR BLD AUTO: 11.21 10*3/MM3 (ref 1.7–7)
NEUTROPHILS NFR BLD AUTO: 95 % (ref 42.7–76)
NITRITE UR QL STRIP: NEGATIVE
NRBC BLD AUTO-RTO: 0 /100 WBC (ref 0–0.2)
PH UR STRIP.AUTO: <=5 [PH] (ref 5–8)
PLATELET # BLD AUTO: 223 10*3/MM3 (ref 140–450)
PMV BLD AUTO: 9.4 FL (ref 6–12)
POTASSIUM SERPL-SCNC: 3.7 MMOL/L (ref 3.5–5.2)
PROT SERPL-MCNC: 6.5 G/DL (ref 6–8.5)
PROT UR QL STRIP: ABNORMAL
QT INTERVAL: 392 MS
QT INTERVAL: 395 MS
QTC INTERVAL: 466 MS
QTC INTERVAL: 482 MS
RBC # BLD AUTO: 4.28 10*6/MM3 (ref 4.14–5.8)
SODIUM SERPL-SCNC: 139 MMOL/L (ref 136–145)
SP GR UR STRIP: >1.03 (ref 1–1.03)
TROPONIN T NUMERIC DELTA: NORMAL
TROPONIN T SERPL HS-MCNC: <6 NG/L
UROBILINOGEN UR QL STRIP: ABNORMAL
WBC NRBC COR # BLD AUTO: 11.8 10*3/MM3 (ref 3.4–10.8)
WHOLE BLOOD HOLD COAG: NORMAL
WHOLE BLOOD HOLD SPECIMEN: NORMAL

## 2025-05-21 PROCEDURE — 83735 ASSAY OF MAGNESIUM: CPT | Performed by: EMERGENCY MEDICINE

## 2025-05-21 PROCEDURE — 25810000003 SODIUM CHLORIDE 0.9 % SOLUTION: Performed by: EMERGENCY MEDICINE

## 2025-05-21 PROCEDURE — 83605 ASSAY OF LACTIC ACID: CPT | Performed by: NURSE PRACTITIONER

## 2025-05-21 PROCEDURE — 93005 ELECTROCARDIOGRAM TRACING: CPT | Performed by: NURSE PRACTITIONER

## 2025-05-21 PROCEDURE — 99284 EMERGENCY DEPT VISIT MOD MDM: CPT

## 2025-05-21 PROCEDURE — 36415 COLL VENOUS BLD VENIPUNCTURE: CPT

## 2025-05-21 PROCEDURE — 85025 COMPLETE CBC W/AUTO DIFF WBC: CPT | Performed by: EMERGENCY MEDICINE

## 2025-05-21 PROCEDURE — 82550 ASSAY OF CK (CPK): CPT | Performed by: NURSE PRACTITIONER

## 2025-05-21 PROCEDURE — 82948 REAGENT STRIP/BLOOD GLUCOSE: CPT

## 2025-05-21 PROCEDURE — 73502 X-RAY EXAM HIP UNI 2-3 VIEWS: CPT

## 2025-05-21 PROCEDURE — 81003 URINALYSIS AUTO W/O SCOPE: CPT | Performed by: EMERGENCY MEDICINE

## 2025-05-21 PROCEDURE — 71045 X-RAY EXAM CHEST 1 VIEW: CPT

## 2025-05-21 PROCEDURE — 93005 ELECTROCARDIOGRAM TRACING: CPT | Performed by: EMERGENCY MEDICINE

## 2025-05-21 PROCEDURE — 80053 COMPREHEN METABOLIC PANEL: CPT | Performed by: EMERGENCY MEDICINE

## 2025-05-21 PROCEDURE — 97161 PT EVAL LOW COMPLEX 20 MIN: CPT | Performed by: PHYSICAL THERAPIST

## 2025-05-21 PROCEDURE — 84484 ASSAY OF TROPONIN QUANT: CPT | Performed by: EMERGENCY MEDICINE

## 2025-05-21 RX ORDER — OXYCODONE AND ACETAMINOPHEN 5; 325 MG/1; MG/1
1 TABLET ORAL ONCE
Refills: 0 | Status: COMPLETED | OUTPATIENT
Start: 2025-05-21 | End: 2025-05-21

## 2025-05-21 RX ORDER — SODIUM CHLORIDE 0.9 % (FLUSH) 0.9 %
10 SYRINGE (ML) INJECTION AS NEEDED
Status: DISCONTINUED | OUTPATIENT
Start: 2025-05-21 | End: 2025-05-21 | Stop reason: HOSPADM

## 2025-05-21 RX ADMIN — SODIUM CHLORIDE 2000 ML: 9 INJECTION, SOLUTION INTRAVENOUS at 13:49

## 2025-05-21 RX ADMIN — OXYCODONE HYDROCHLORIDE AND ACETAMINOPHEN 1 TABLET: 5; 325 TABLET ORAL at 13:24

## 2025-05-21 NOTE — DISCHARGE INSTRUCTIONS
Continue to monitor your symptoms at home. Keep your follow up appointment with your Orthopedic doctor.    Follow up with your Primary Care Provider in 3-5 days especially if symptoms worsen.    Return to the Emergency Department if you develop any uncontrollable fever, intractable pain, nausea, vomiting.

## 2025-05-21 NOTE — ED NOTES
61 Y/O male pt BIBA for C/O weakness and pain post right hip surgery this morning. Per family, surgery start time was 0700 and was DC around 11am this morning. Wife stated that when she got him home he had a hard time getting to the front door and thought he was going to pass out so decided to call EMS because she wouldn't be able to get him off the ground if he were to fall. Pt is awake alert and oriented x4. Pt a little sleepy but has no other complaints. Denies any CP, SOB, fever or chills. Pt is reporting 7/10 right hip pain but does not appear to be in any distress. Pt on CRM. VSS. Waiting on provider at th is time. CP

## 2025-05-21 NOTE — ED PROVIDER NOTES
Time: 12:12 PM EDT  Date of encounter:  5/21/2025  Independent Historian/Clinical History and Information was obtained by:   Patient and Family    History is limited by: N/A    Chief Complaint: Right hip pain    History of Present Illness:  Patient is a 60 y.o. year old male who presents to the emergency department for evaluation of right hip pain status post right hip replacement this morning.  Patient and wife report that he had right hip replacement at the surgery center, when they arrived home he experienced increased pain when he got out of the vehicle.  By the time his wife assisted him into the house and to the couch, he was pale and his wife was concerned he was going to pass out.  She immediately called EMS who transported him to the emergency department.  Patient is currently alert and oriented x 4, reports the pain to his right hip is a 7 out of 10, dressing to his right hip is dry, clean and intact.      Patient Care Team  Primary Care Provider: Anthony Berrios MD    Past Medical History:     Allergies   Allergen Reactions    Amoxicillin-Pot Clavulanate Rash     Past Medical History:   Diagnosis Date    Diverticulitis of colon 2022    GERD (gastroesophageal reflux disease) 4/2024    Rotator cuff syndrome 09/15/2022    Rotator cuff tendonitis, left     Urinary frequency      Past Surgical History:   Procedure Laterality Date    COLONOSCOPY      COLONOSCOPY N/A 12/16/2022    Procedure: COLONOSCOPY WITH POLYPECTOMIES;  Surgeon: Saniya Garza MD;  Location: [facility];  Service: Gastroenterology;  Laterality: N/A;  COLON POLYPS, DIVERTICULOSIS    HAND SURGERY      Trigger finger    KNEE ARTHROSCOPY Bilateral     KNEE SURGERY  ?    SHOULDER ARTHROSCOPY Right     SHOULDER ARTHROSCOPY W/ ROTATOR CUFF REPAIR Left 02/06/2023    Procedure: SHOULDER ARTHROSCOPY WITH ROTATOR CUFF AND LABERAL DEBRIDMENT, DISTAL CLAVICLE EXCISION, AND SUBCROMIAL DECOMPRESSION;  Surgeon: Major Hickman MD;   Location: [facility] [place];  Service: Orthopedics;  Laterality: Left;    SHOULDER SURGERY  ?    Bicep tear    TONSILLECTOMY      TRIGGER FINGER RELEASE Right     TRIGGER POINT INJECTION      Had surgery     Family History   Problem Relation Age of Onset    Cancer Father         Prostrate cancer    Malig Hyperthermia Neg Hx        Home Medications:  Prior to Admission medications    Medication Sig Start Date End Date Taking? Authorizing Provider   alfuzosin (UROXATRAL) 10 MG 24 hr tablet Take 1 tablet by mouth Daily.    Provider, MD Dora   aspirin 81 MG EC tablet 1 tablet Daily. TAKES FOR HEART HEALTH REPORTED LAST DOSE TAKEN WAS  1/15/23    Emergency, Nurse Herbie, RN   ibuprofen (ADVIL,MOTRIN) 800 MG tablet Take 1 tablet by mouth 2 (Two) Times a Day. 5/2/23   Pablo Steinberg PA   omeprazole (priLOSEC) 40 MG capsule Take 1 capsule by mouth Daily. 3/6/25   Nilda Gordon APRN        Social History:   Social History     Tobacco Use    Smoking status: Never    Smokeless tobacco: Former     Types: Chew   Vaping Use    Vaping status: Never Used   Substance Use Topics    Alcohol use: Yes     Alcohol/week: 1.0 standard drink of alcohol     Types: 1 Cans of beer per week     Comment: rare    Drug use: Never         Review of Systems:  Review of Systems   Constitutional:  Negative for chills and fever.   HENT:  Negative for congestion, rhinorrhea and sore throat.    Eyes:  Negative for pain and visual disturbance.   Respiratory:  Negative for apnea, cough, chest tightness and shortness of breath.    Cardiovascular:  Negative for chest pain and palpitations.   Gastrointestinal:  Negative for abdominal pain, diarrhea, nausea and vomiting.   Genitourinary:  Negative for difficulty urinating and dysuria.   Musculoskeletal:  Positive for gait problem (Just had right hip replacement this morning, uses walker). Negative for myalgias.   Skin:  Negative for color change.   Neurological:  Negative for dizziness,  "seizures, syncope and headaches.   Psychiatric/Behavioral: Negative.     All other systems reviewed and are negative.       Physical Exam:  /98 (BP Location: Left arm, Patient Position: Lying)   Pulse 86   Temp 97.8 °F (36.6 °C)   Resp 16   Ht 177.8 cm (70\")   Wt 94.8 kg (209 lb)   SpO2 96%   BMI 29.99 kg/m²     Physical Exam  Vitals and nursing note reviewed.   Constitutional:       General: He is not in acute distress.     Appearance: Normal appearance. He is not toxic-appearing.   HENT:      Head: Normocephalic and atraumatic.      Jaw: There is normal jaw occlusion.   Eyes:      General: Lids are normal.      Extraocular Movements: Extraocular movements intact.      Conjunctiva/sclera: Conjunctivae normal.      Pupils: Pupils are equal, round, and reactive to light.   Cardiovascular:      Rate and Rhythm: Normal rate and regular rhythm.      Pulses: Normal pulses.      Heart sounds: Normal heart sounds.   Pulmonary:      Effort: Pulmonary effort is normal. No respiratory distress.      Breath sounds: Normal breath sounds. No wheezing or rhonchi.   Abdominal:      General: Abdomen is flat.      Palpations: Abdomen is soft.      Tenderness: There is no abdominal tenderness. There is no guarding or rebound.   Musculoskeletal:         General: Tenderness (Right hip at surgery site) present. Normal range of motion.      Cervical back: Normal range of motion and neck supple.      Right lower leg: No edema.      Left lower leg: No edema.      Comments: Patient had right hip replacement this morning at the surgery center.  Dressing to his right hip is dry, clean, intact.  He is temporarily using a walker to ambulate, as he rehabs.   Skin:     General: Skin is warm and dry.   Neurological:      Mental Status: He is alert and oriented to person, place, and time. Mental status is at baseline.   Psychiatric:         Mood and Affect: Mood normal.        qSOFA (Quick SOFA) Score for Sepsis - MDCalc  Calculated on " May 21 2025 3:44 PM  0 points -> qSOFA Score  Not high risk -> If sepsis is still suspected, continue to monitor, evaluate, and initiate treatment as appropriate, including serial qSOFA assessments.      SIRS, Sepsis, and Septic Shock Criteria - MDCalc  Calculated on May 21 2025 3:46 PM  This patient does not meet SIRS criteria. For other causes of shock, see the Next Steps section.        Medical Decision Making:      Comorbidities that affect care:    None    External Notes reviewed:    None      The following orders were placed and all results were independently analyzed by me:  Orders Placed This Encounter   Procedures    XR Chest 1 View    XR Hip With or Without Pelvis 2 - 3 View Right    Ralph Draw    Comprehensive Metabolic Panel    High Sensitivity Troponin T    Magnesium    Urinalysis With Microscopic If Indicated (No Culture) - Urine, Clean Catch    CBC Auto Differential    High Sensitivity Troponin T 1Hr    CK    Lactic Acid, Plasma    STAT Lactic Acid, Reflex    NPO Diet NPO Type: Strict NPO    Undress & Gown    Continuous Pulse Oximetry    Vital Signs    Orthostatic Blood Pressure    Monitor Blood Pressure    Orthostatic Blood Pressure    Can you please go ahead and draw the lactic acid now, instead of waiting until 1705.  Thank you  Misc Nursing Order (Specify)    PT Consult: Eval & Treat Functional Mobility Below Baseline    PT Plan of Care Cert / Re-Cert    Oxygen Therapy- Nasal Cannula; Titrate 1-6 LPM Per SpO2; 90 - 95%    POC Glucose Once    POC Glucose Once    ECG 12 Lead Other; weak    ECG 12 Lead Pre-Op / Pre-Procedure    Insert Peripheral IV    Fall Precautions    CBC & Differential    Green Top (Gel)    Lavender Top    Gold Top - SST    Light Blue Top       Medications Given in the Emergency Department:  Medications   sodium chloride 0.9 % flush 10 mL (has no administration in time range)   oxyCODONE-acetaminophen (PERCOCET) 5-325 MG per tablet 1 tablet (1 tablet Oral Given 5/21/25 1324)    sodium chloride 0.9 % bolus 2,000 mL (0 mL Intravenous Stopped 5/21/25 1645)        ED Course:    ED Course as of 05/21/25 1800   Wed May 21, 2025   1356 B/p re-evaluated after fluid bolus started, and it is now 110/64 [TB]   1653 Taking over this patient from MANDEEP Martínez. [MV]   1654 Report given to Adrián Teague [TB]   1751 Lactate: 1.7  Lactate improved with fluids. [MV]   1800 At time of discharge, patient states that he is feeling better.  Vital signs stable.  Patient states that he is ready to go home. [MV]      ED Course User Index  [MV] Adrián Teague PA  [TB] Delfina Alacraz APRN       Labs:    Lab Results (last 24 hours)       Procedure Component Value Units Date/Time    CBC & Differential [[id number]]  (Abnormal) Collected: 05/21/25 1230    Specimen: Blood Updated: 05/21/25 1239    Narrative:      The following orders were created for panel order CBC & Differential.  Procedure                               Abnormality         Status                     ---------                               -----------         ------                     CBC Auto Differential[[id number]]        Abnormal            Final result                 Please view results for these tests on the individual orders.    Comprehensive Metabolic Panel [[id number]]  (Abnormal) Collected: 05/21/25 1230    Specimen: Blood Updated: 05/21/25 1302     Glucose 198 mg/dL      BUN 24 mg/dL      Creatinine 1.02 mg/dL      Sodium 139 mmol/L      Potassium 3.7 mmol/L      Chloride 106 mmol/L      CO2 20.8 mmol/L      Calcium 9.5 mg/dL      Total Protein 6.5 g/dL      Albumin 4.2 g/dL      ALT (SGPT) 22 U/L      AST (SGOT) 24 U/L      Alkaline Phosphatase 66 U/L      Total Bilirubin 0.3 mg/dL      Globulin 2.3 gm/dL      A/G Ratio 1.8 g/dL      BUN/Creatinine Ratio 23.5     Anion Gap 12.2 mmol/L      eGFR 84.1 mL/min/1.73     Narrative:      GFR Categories in Chronic Kidney Disease (CKD)              GFR Category          GFR (mL/min/1.73)     Interpretation  G1                    90 or greater        Normal or high (1)  G2                    60-89                Mild decrease (1)  G3a                   45-59                Mild to moderate decrease  G3b                   30-44                Moderate to severe decrease  G4                    15-29                Severe decrease  G5                    14 or less           Kidney failure    (1)In the absence of evidence of kidney disease, neither GFR category G1 or G2 fulfill the criteria for CKD.    eGFR calculation 2021 CKD-EPI creatinine equation, which does not include race as a factor    High Sensitivity Troponin T [[id number]]  (Normal) Collected: 05/21/25 1230    Specimen: Blood Updated: 05/21/25 1307     HS Troponin T <6 ng/L     Narrative:      High Sensitive Troponin T Reference Range:  <14.0 ng/L- Negative Female for AMI  <22.0 ng/L- Negative Male for AMI  >=14 - Abnormal Female indicating possible myocardial injury.  >=22 - Abnormal Male indicating possible myocardial injury.   Clinicians would have to utilize clinical acumen, EKG, Troponin, and serial changes to determine if it is an Acute Myocardial Infarction or myocardial injury due to an underlying chronic condition.         Magnesium [[id number]]  (Normal) Collected: 05/21/25 1230    Specimen: Blood Updated: 05/21/25 1302     Magnesium 2.2 mg/dL     CBC Auto Differential [[id number]]  (Abnormal) Collected: 05/21/25 1230    Specimen: Blood Updated: 05/21/25 1239     WBC 11.80 10*3/mm3      RBC 4.28 10*6/mm3      Hemoglobin 12.6 g/dL      Hematocrit 36.8 %      MCV 86.0 fL      MCH 29.4 pg      MCHC 34.2 g/dL      RDW 13.0 %      RDW-SD 40.4 fl      MPV 9.4 fL      Platelets 223 10*3/mm3      Neutrophil % 95.0 %      Lymphocyte % 2.6 %      Monocyte % 1.7 %      Eosinophil % 0.0 %      Basophil % 0.2 %      Immature Grans % 0.5 %      Neutrophils, Absolute 11.21 10*3/mm3      Lymphocytes, Absolute 0.31 10*3/mm3      Monocytes, Absolute 0.20  10*3/mm3      Eosinophils, Absolute 0.00 10*3/mm3      Basophils, Absolute 0.02 10*3/mm3      Immature Grans, Absolute 0.06 10*3/mm3      nRBC 0.0 /100 WBC     CK [[id number]]  (Abnormal) Collected: 05/21/25 1230    Specimen: Blood Updated: 05/21/25 1404     Creatine Kinase 416 U/L     POC Glucose Once [[id number]]  (Abnormal) Collected: 05/21/25 1248    Specimen: Blood Updated: 05/21/25 1703     Glucose 290 mg/dL      Comment: Serial Number: [id number]:  [id number]       Urinalysis With Microscopic If Indicated (No Culture) - Urine, Clean Catch [[id number]]  (Abnormal) Collected: 05/21/25 1352    Specimen: Urine, Clean Catch Updated: 05/21/25 1402     Color, UA Dark Yellow     Appearance, UA Turbid     pH, UA <=5.0     Specific Gravity, UA >1.030     Glucose, UA Negative     Ketones, UA Trace     Bilirubin, UA Negative     Blood, UA Negative     Protein, UA Trace     Leuk Esterase, UA Negative     Nitrite, UA Negative     Urobilinogen, UA 1.0 E.U./dL    Narrative:      Urine microscopic not indicated.    High Sensitivity Troponin T 1Hr [[id number]] Collected: 05/21/25 1405    Specimen: Blood Updated: 05/21/25 1427     HS Troponin T <6 ng/L      Troponin T Numeric Delta --     Comment: Unable to calculate.       Narrative:      High Sensitive Troponin T Reference Range:  <14.0 ng/L- Negative Female for AMI  <22.0 ng/L- Negative Male for AMI  >=14 - Abnormal Female indicating possible myocardial injury.  >=22 - Abnormal Male indicating possible myocardial injury.   Clinicians would have to utilize clinical acumen, EKG, Troponin, and serial changes to determine if it is an Acute Myocardial Infarction or myocardial injury due to an underlying chronic condition.         Lactic Acid, Plasma [[id number]]  (Abnormal) Collected: 05/21/25 1405    Specimen: Blood Updated: 05/21/25 1441     Lactate 3.0 mmol/L     STAT Lactic Acid, Reflex [[id number]]  (Normal) Collected: 05/21/25 1652    Specimen: Blood Updated: 05/21/25 5598      Lactate 1.7 mmol/L              Imaging:    XR Hip With or Without Pelvis 2 - 3 View Right  Result Date: 5/21/2025  XR HIP W OR WO PELVIS 2-3 VIEW RIGHT Date of Exam: 5/21/2025 2:30 PM EDT Indication: Patient was a hip replacement this morning at outpatient surgery, verifying no change. Comparison: MRI of the right hip 12/16/2024, right hip radiograph 11/15/2024 Findings: There is a right total hip arthroplasty. No periprosthetic fracture. There is soft tissue gas. Components of appropriate position.     Impression: Expected immediate postop appearance of hip status post total hip arthroplasty. Electronically Signed: Alyce Huizar MD  5/21/2025 2:53 PM EDT  Workstation ID: [id number]    XR Chest 1 View  Result Date: 5/21/2025  XR CHEST 1 VW Date of Exam: 5/21/2025 12:41 PM EDT Indication: Weak/Dizzy/AMS triage protocol Comparison: 10/9/2024 Findings: Cardiomediastinal silhouette is unremarkable.  No airspace disease, pneumothorax, nor pleural effusion. No acute osseous abnormality identified.     Impression: No active disease. Electronically Signed: Issa Heredia MD  5/21/2025 1:00 PM EDT  Workstation ID: [id number]        Differential Diagnosis and Discussion:    Extremity Pain: Differential diagnosis includes but is not limited to soft tissue sprain, tendonitis, tendon injury, dislocation, fracture, deep vein thrombosis, arterial insufficiency, osteoarthritis, bursitis, and ligamentous damage.    PROCEDURES:    Labs were collected in the emergency department and all labs were reviewed and interpreted by me.  X-ray were performed in the emergency department and all X-ray impressions were independently interpreted by me.    ECG 12 Lead Pre-Op / Pre-Procedure   Preliminary Result   HEART RATE=85  bpm   RR Lqqtvoyz=440  ms   NE Rmjphsgd=357  ms   P Horizontal Axis=7  deg   P Front Axis=51  deg   QRSD Szkqfetm=427  ms   QT Dwfvjoij=059  ms   OAdB=490  ms   QRS Axis=25  deg   T Wave Axis=-20  deg   - ABNORMAL ECG -    Sinus rhythm   Right bundle branch block   Date and Time of Study:2025-05-21 13:59:25      ECG 12 Lead Other; weak   Preliminary Result   HEART RATE=89  bpm   RR Ruaqmbsb=355  ms   NY Amejqhmt=905  ms   P Horizontal Axis=0  deg   P Front Axis=44  deg   QRSD Bcwrrwwt=484  ms   QT Ejslnrae=611  ms   VFmY=009  ms   QRS Axis=-126  deg   T Wave Axis=-23  deg   - ABNORMAL ECG -   Sinus rhythm   Right bundle branch block   Date and Time of Study:2025-05-21 12:34:46          Procedures    MDM     Amount and/or Complexity of Data Reviewed  Clinical lab tests: reviewed  Tests in the radiology section of CPT®: reviewed  Tests in the medicine section of CPT®: reviewed       I have explained the patient´s condition, diagnoses and treatment plan based on the information available to me at this time. I have answered questions and addressed any concerns. The patient has a good  understanding of the patient´s diagnosis, condition, and treatment plan as can be expected at this point. The vital signs have been stable. The patient´s condition is stable and appropriate for discharge from the emergency department.      The patient will pursue further outpatient evaluation with the primary care physician or other designated or consulting physician as outlined in the discharge instructions. They are agreeable to this plan of care and follow-up instructions have been explained in detail. The patient has received these instructions in written format and has expressed an understanding of the discharge instructions. The patient is aware that any significant change in condition or worsening of symptoms should prompt an immediate return to this or the closest emergency department or call to 911.     The patient´s CBC that was reviewed and interpreted by me shows no abnormalities of critical concern. Of note, there is no anemia requiring a blood transfusion and the platelet count is acceptable.  The patient´s CMP that was reviewed and  interpretted by me shows no abnormalities of critical concern. Of note, the patient´s sodium and potassium are acceptable. The patient´s liver enzymes are unremarkable. The patient´s renal function (creatinine) is preserved. The patient has a normal anion gap.  Lactic acid is 3.0, patient is receiving 2 L of normal saline.                  Patient Care Considerations:    CT EXTREMITY: I considered ordering an extremity CT, however patient has not had a fall or injury since his surgery earlier today.      Consultants/Shared Management Plan:    SHARED VISIT: I have discussed the case with my supervising physician, Chio who states he agrees with my plan of care and disposition. The substantive portion of the medical decision was made by the attesting physician who made or approve the management plan and will take responsibility for the patient.  Clinical findings were discussed and ultimate disposition was made in consult with supervising physician.    Social Determinants of Health:    Patient is independent, reliable, and has access to care.       Disposition and Care Coordination:    Discharged: The patient is suitable and stable for discharge with no need for consideration of admission.    I have explained the patient´s condition, diagnoses and treatment plan based on the information available to me at this time. I have answered questions and addressed any concerns. The patient has a good  understanding of the patient´s diagnosis, condition, and treatment plan as can be expected at this point. The vital signs have been stable. The patient´s condition is stable and appropriate for discharge from the emergency department.      The patient will pursue further outpatient evaluation with the primary care physician or other designated or consulting physician as outlined in the discharge instructions. They are agreeable to this plan of care and follow-up instructions have been explained in detail. The patient has  received these instructions in written format and has expressed an understanding of the discharge instructions. The patient is aware that any significant change in condition or worsening of symptoms should prompt an immediate return to this or the closest emergency department or call to 911.  I have explained discharge medications and the need for follow up with the patient/caretakers. This was also printed in the discharge instructions. Patient was discharged with the following medications and follow up:      Medication List      No changes were made to your prescriptions during this visit.      Anthony Berrios MD  [address] [place] [address] [address] [place] [address] [phone]    In 1 week         Final diagnoses:   Postprocedural hypotension        ED Disposition       ED Disposition   Discharge    Condition   Stable    Comment   --               This medical record created using voice recognition software.             Adrián Teague PA  05/21/25 1800

## 2025-05-21 NOTE — THERAPY EVALUATION
Patient Name: Patel Monreal  : 1965    MRN: [id number]                              Today's Date: 2025       Admit Date: 2025    Visit Dx:     ICD-10-CM ICD-9-CM   1. Difficulty walking  R26.2 719.7   2. Status post total replacement of right hip  Z96.641 V43.64     Patient Active Problem List   Diagnosis    Acute medial meniscus tear of left knee    Left knee pain    Baker cyst, left    Primary osteoarthritis of left knee    Cystic adventitial disease of popliteal artery    Diverticulitis    History of colon polyps    Labral tear of shoulder, degenerative, left    Primary osteoarthritis of left shoulder    Osteoarthritis of AC (acromioclavicular) joint    Incomplete tear of left rotator cuff    Biceps tendonitis, left    Rotator cuff tendonitis, left    Incomplete rotator cuff tear or rupture of left shoulder, not specified as traumatic    Aftercare following surgery of left shoulder arthroscopic rotator cuff debridment, labral debridement, distal clavicle excision, SAD, chondroplasty, 23    Primary osteoarthritis of right knee    Osteoarthritis of right knee, unspecified osteoarthritis type     Past Medical History:   Diagnosis Date    Diverticulitis of colon     GERD (gastroesophageal reflux disease) 2024    Rotator cuff syndrome 09/15/2022    Rotator cuff tendonitis, left     Urinary frequency      Past Surgical History:   Procedure Laterality Date    COLONOSCOPY      COLONOSCOPY N/A 2022    Procedure: COLONOSCOPY WITH POLYPECTOMIES;  Surgeon: Saniya Garza MD;  Location: [facility] ENDOSCOPY;  Service: Gastroenterology;  Laterality: N/A;  COLON POLYPS, DIVERTICULOSIS    HAND SURGERY      Trigger finger    KNEE ARTHROSCOPY Bilateral     KNEE SURGERY  ?    SHOULDER ARTHROSCOPY Right     SHOULDER ARTHROSCOPY W/ ROTATOR CUFF REPAIR Left 2023    Procedure: SHOULDER ARTHROSCOPY WITH ROTATOR CUFF AND LABERAL DEBRIDMENT, DISTAL CLAVICLE EXCISION, AND SUBCROMIAL DECOMPRESSION;   Surgeon: Major Hickman MD;  Location: [facility] [place];  Service: Orthopedics;  Laterality: Left;    SHOULDER SURGERY  ?    Bicep tear    TONSILLECTOMY      TRIGGER FINGER RELEASE Right     TRIGGER POINT INJECTION      Had surgery      General Information       Row Name 05/21/25 1408          Physical Therapy Time and Intention    Document Type evaluation  -LR     Mode of Treatment individual therapy  -LR       Row Name 05/21/25 1401          General Information    Patient Profile Reviewed yes  -LR               User Key  (r) = Recorded By, (t) = Taken By, (c) = Cosigned By      Initials Name Provider Type    LR Colleen Stoll PT Physical Therapist                  History: Pt is a 60 year old male who underwent a R GABY this morning.  Pt's wife states the surgery was at 0700 and he was discharged at 0900.  While walking inside the house the pt started having a hard time staying awake and seemed like he was going to pass out.  Pt's wife called EMS.  Physical therapy evaluation was performed to determine if pt is able to safely ambulate and return home with wife for assistance.  Pt reports he also had a L TKA 4 weeks ago.        ROM: R hip ROM limited due to R GABY; R knee ROM limited due to recent R TKA    Gait: pt ambulated 100 ft with RW with SBA    Functional Mobility  Supine to sit transfer: minimum assistance x1 with R LE  Sit to stand: SBA with rolling walker    Balance  Static standing balance: good  Dynamic standing balance: good    Assessment/Plan: Physical therapy evaluation was performed to determine if pt could safely ambulate and return home following his L GABY.  Pt was able to ambulate 100 ft with a RW and CGA without evidence of imbalance.  After walking pt was standing beside bed and became lightheaded.  Pt returned to sitting and nurse was called.  Pt was having difficulty staying alert/awake without a sternal rub.  BP in sitting was 60/27.  Pt was returned to a supine position.  Pt's BP did  improve upon lying supine.  Pt was left in the room with nurse at bedside.  From a mobility standpoint, pt is safe to return home with assistance as he is able to safely ambulate with the RW without evidence of imbalance.      Discharge Recommendations: discharge home with RW and assistance from wife for ADLs      Outcome Measures       Row Name 05/21/25 1402          Optimal Instrument    Optimal Instrument Optimal - 3  -LR     Moving - lying to sitting 2  -LR     Standing 2  -LR     Walking - short distance 2  -LR     From the list, choose the 3 activities you would most like to be able to do without any difficulty Moving -lying to sitting;Standing;Walking -short distance  -LR     Total Score Optimal - 3 4  -LR       Row Name 05/21/25 1402          Functional Assessment    Outcome Measure Options Optimal Instrument  -LR               User Key  (r) = Recorded By, (t) = Taken By, (c) = Cosigned By      Initials Name Provider Type    Colleen Stallworth, PT Physical Therapist                       Time Calculation:   PT Evaluation Complexity  History, PT Evaluation Complexity: 3 or more personal factors and/or comorbidities  Examination of Body Systems (PT Eval Complexity): 1-2 elements  Clinical Presentation (PT Evaluation Complexity): evolving  Clinical Decision Making (PT Evaluation Complexity): low complexity  Overall Complexity (PT Evaluation Complexity): low complexity     PT Charges       Row Name 05/21/25 1409             Time Calculation    PT Received On 05/21/25  -LR         Untimed Charges    PT Eval/Re-eval Minutes 41  -LR         Total Minutes    Untimed Charges Total Minutes 41  -LR       Total Minutes 41  -LR                User Key  (r) = Recorded By, (t) = Taken By, (c) = Cosigned By      Initials Name Provider Type    Colleen Stallworth, SHREYAS Physical Therapist                  Therapy Charges for Today       Code Description Service Date Service Provider Modifiers Qty    09356067137 HC PT EVAL LOW  COMPLEXITY 3 5/21/2025 Colleen Stoll, PT GP 1            PT G-Codes  Outcome Measure Options: Optimal Instrument       Colleen Stoll, PT  5/21/2025

## 2025-06-04 LAB
QT INTERVAL: 392 MS
QT INTERVAL: 395 MS
QTC INTERVAL: 466 MS
QTC INTERVAL: 482 MS

## 2025-06-13 ENCOUNTER — OFFICE VISIT (OUTPATIENT)
Age: 60
End: 2025-06-13
Payer: COMMERCIAL

## 2025-06-13 VITALS
BODY MASS INDEX: 30.64 KG/M2 | HEART RATE: 75 BPM | SYSTOLIC BLOOD PRESSURE: 130 MMHG | DIASTOLIC BLOOD PRESSURE: 82 MMHG | HEIGHT: 70 IN | WEIGHT: 214 LBS

## 2025-06-13 DIAGNOSIS — I77.810 ASCENDING AORTA DILATATION: Primary | ICD-10-CM

## 2025-06-13 DIAGNOSIS — G47.33 OSA (OBSTRUCTIVE SLEEP APNEA): ICD-10-CM

## 2025-06-13 DIAGNOSIS — I45.10 RBBB: ICD-10-CM

## 2025-06-13 PROCEDURE — 99204 OFFICE O/P NEW MOD 45 MIN: CPT | Performed by: INTERNAL MEDICINE

## 2025-06-13 PROCEDURE — 93000 ELECTROCARDIOGRAM COMPLETE: CPT | Performed by: INTERNAL MEDICINE

## 2025-06-13 NOTE — PROGRESS NOTES
Subjective:     Encounter Date:06/13/2025      Patient ID: Patel Monreal is a 60 y.o. male.    Chief Complaint:  History of Present Illness    This is a 60-year-old with obstructive sleep apnea that is currently untreated, chronic right bundle branch block, who presents for evaluation of ascending aortic dilatation.    The patient underwent an EKG in 5/2025 as part of his preop evaluation before right knee surgery.  This incidentally showed evidence of a right bundle branch block.  He was referred to Dr. Villeda who proceeded with an echocardiogram.  I do not have that report to review but this apparently showed evidence of a sitting aortic dilatation measuring 4.3 cm.  Dr. Villeda recommended a CT scan in the future following his surgery.    The patient underwent successful right knee surgery but about a month later required urgent right hip replacement.  He is now 3 weeks out from that last surgery and is recovering well.    He denies any chest pain, shortness of breath, palpitation, orthopnea, near-syncope or syncope or lower extremity swelling.  He reports that his father required a pacemaker but denies any history of coronary artery disease or aneurysms in the family.    The patient reportedly has a history of obstructive sleep apnea but has not been treated.  This apparently became an issue around the time of his recent surgeries.  He was encouraged to get back on treatment.    Review of Systems   Constitutional: Negative for malaise/fatigue.   HENT:  Negative for hearing loss, hoarse voice, nosebleeds and sore throat.    Eyes:  Negative for pain.   Cardiovascular:  Negative for chest pain, claudication, cyanosis, dyspnea on exertion, irregular heartbeat, leg swelling, near-syncope, orthopnea, palpitations, paroxysmal nocturnal dyspnea and syncope.   Respiratory:  Negative for shortness of breath and snoring.    Endocrine: Negative for cold intolerance, heat intolerance, polydipsia, polyphagia and polyuria.    Skin:  Negative for itching and rash.   Musculoskeletal:  Negative for arthritis, falls, joint pain, joint swelling, muscle cramps, muscle weakness and myalgias.   Gastrointestinal:  Negative for constipation, diarrhea, dysphagia, heartburn, hematemesis, hematochezia, melena, nausea and vomiting.   Genitourinary:  Negative for frequency, hematuria and hesitancy.   Neurological:  Negative for excessive daytime sleepiness, dizziness, headaches, light-headedness, numbness and weakness.   Psychiatric/Behavioral:  Negative for depression. The patient is not nervous/anxious.          Current Outpatient Medications:     alfuzosin (UROXATRAL) 10 MG 24 hr tablet, Take 1 tablet by mouth Daily., Disp: , Rfl:     aspirin 81 MG EC tablet, 1 tablet Daily. TAKES FOR HEART HEALTH REPORTED LAST DOSE TAKEN WAS  1/15/23, Disp: , Rfl:     ibuprofen (ADVIL,MOTRIN) 800 MG tablet, Take 1 tablet by mouth 2 (Two) Times a Day., Disp: 60 tablet, Rfl: 2    omeprazole (priLOSEC) 40 MG capsule, Take 1 capsule by mouth Daily., Disp: 90 capsule, Rfl: 1    Past Medical History:   Diagnosis Date    Abnormal ECG 04/2025    Aneurysm [id number]    Diverticulitis of colon 2022    GERD (gastroesophageal reflux disease) 4/2024    Periarthritis of shoulder 2/07/22    Rotator cuff syndrome 09/15/2022    Rotator cuff tendonitis, left     Sleep apnea 08/2023    Tennis elbow 1/01/20    Urinary frequency        Past Surgical History:   Procedure Laterality Date    COLONOSCOPY      COLONOSCOPY N/A 12/16/2022    Procedure: COLONOSCOPY WITH POLYPECTOMIES;  Surgeon: Saniya Garza MD;  Location: [facility] ENDOSCOPY;  Service: Gastroenterology;  Laterality: N/A;  COLON POLYPS, DIVERTICULOSIS    HAND SURGERY  2018    Trigger finger    KNEE ARTHROSCOPY Bilateral     KNEE SURGERY  ?    SHOULDER ARTHROSCOPY Right     SHOULDER ARTHROSCOPY W/ ROTATOR CUFF REPAIR Left 02/06/2023    Procedure: SHOULDER ARTHROSCOPY WITH ROTATOR CUFF AND LABERAL DEBRIDMENT, DISTAL  "CLAVICLE EXCISION, AND SUBCROMIAL DECOMPRESSION;  Surgeon: Major Hickman MD;  [place] [facility] [place] [facility] [place]  Service: Orthopedics;  Laterality: Left;    SHOULDER SURGERY  ?    Bicep tear    TONSILLECTOMY      TRIGGER FINGER RELEASE Right     TRIGGER POINT INJECTION  2017    Had surgery       Family History   Problem Relation Age of Onset    No Known Problems Mother     Cancer Father         Prostrate cancer    Heart disease Father     Malig Hyperthermia Neg Hx        Social History     Tobacco Use    Smoking status: Never    Smokeless tobacco: Former     Types: Chew   Vaping Use    Vaping status: Never Used   Substance Use Topics    Alcohol use: Not Currently     Alcohol/week: 1.0 standard drink of alcohol     Types: 1 Cans of beer per week     Comment: rare    Drug use: Never         ECG 12 Lead    Date/Time: 6/13/2025 3:57 PM  Performed by: Manasa Garcia MD    Authorized by: Manasa Garcia MD  Comparison: compared with previous ECG   Similar to previous ECG  Rhythm: sinus rhythm  Conduction: right bundle branch block             Objective:     Visit Vitals  /82   Pulse 75   Ht 177.8 cm (70\")   Wt 97.1 kg (214 lb)   BMI 30.71 kg/m²         Constitutional:       Appearance: Normal appearance. Well-developed.   HENT:      Head: Normocephalic and atraumatic.   Neck:      Vascular: No carotid bruit or JVD.   Pulmonary:      Effort: Pulmonary effort is normal.      Breath sounds: Normal breath sounds.   Cardiovascular:      Normal rate. Regular rhythm.      No gallop.    Pulses:     Radial: 2+ bilaterally.  Edema:     Peripheral edema absent.   Abdominal:      Palpations: Abdomen is soft.   Skin:     General: Skin is warm and dry.   Neurological:      Mental Status: Alert and oriented to person, place, and time.             Assessment:          Diagnosis Plan   1. Ascending aorta dilatation  CT Angiogram Chest      2. REYMUNDO (obstructive sleep apnea)        3. RBBB               Plan:         1.  Ascending " arctic dilatation.  Noted incidentally on echocardiogram and reportedly measuring 4.3 cm.  Agree that we should proceed with a CT angiogram of chest for further evaluation.  Will also get a copy of his recent echocardiogram report to review.  His blood pressures and heart rates appear to be well-controlled without any medications.  Will monitor closely and would have a low threshold to start him on a beta-blocker or ARB as indicated.  2.  Obstructive sleep apnea.  Currently not treated.  3.  Right bundle branch block.  Will get obtain a copy of his recent echocardiogram.  This may be related to his untreated sleep apnea.    I will call and discussed the results of the CT angiogram of the chest and determine further recommendations based on those results.

## 2025-07-03 ENCOUNTER — TRANSCRIBE ORDERS (OUTPATIENT)
Dept: LAB | Facility: HOSPITAL | Age: 60
End: 2025-07-03
Payer: COMMERCIAL

## 2025-07-03 ENCOUNTER — LAB (OUTPATIENT)
Dept: LAB | Facility: HOSPITAL | Age: 60
End: 2025-07-03
Payer: COMMERCIAL

## 2025-07-03 DIAGNOSIS — T84.84XA PAIN DUE TO HIP JOINT PROSTHESIS, INITIAL ENCOUNTER: Primary | ICD-10-CM

## 2025-07-03 DIAGNOSIS — Z96.649 PAIN DUE TO HIP JOINT PROSTHESIS, INITIAL ENCOUNTER: ICD-10-CM

## 2025-07-03 DIAGNOSIS — T84.84XA PAIN DUE TO HIP JOINT PROSTHESIS, INITIAL ENCOUNTER: ICD-10-CM

## 2025-07-03 DIAGNOSIS — Z96.649 PAIN DUE TO HIP JOINT PROSTHESIS, INITIAL ENCOUNTER: Primary | ICD-10-CM

## 2025-07-03 LAB
CRP SERPL-MCNC: <0.3 MG/DL (ref 0–0.5)
ERYTHROCYTE [SEDIMENTATION RATE] IN BLOOD: 13 MM/HR (ref 0–20)

## 2025-07-03 PROCEDURE — 36415 COLL VENOUS BLD VENIPUNCTURE: CPT

## 2025-07-03 PROCEDURE — 85652 RBC SED RATE AUTOMATED: CPT

## 2025-07-03 PROCEDURE — 86140 C-REACTIVE PROTEIN: CPT

## 2025-07-15 ENCOUNTER — HOSPITAL ENCOUNTER (OUTPATIENT)
Dept: CT IMAGING | Facility: HOSPITAL | Age: 60
Discharge: HOME | End: 2025-07-15
Admitting: INTERNAL MEDICINE
Payer: COMMERCIAL

## 2025-07-15 DIAGNOSIS — I77.810 ASCENDING AORTA DILATATION: ICD-10-CM

## 2025-07-15 PROCEDURE — 25510000001 IOPAMIDOL PER 1 ML: Performed by: INTERNAL MEDICINE

## 2025-07-15 PROCEDURE — 71275 CT ANGIOGRAPHY CHEST: CPT

## 2025-07-15 RX ORDER — IOPAMIDOL 755 MG/ML
100 INJECTION, SOLUTION INTRAVASCULAR
Status: COMPLETED | OUTPATIENT
Start: 2025-07-15 | End: 2025-07-15

## 2025-07-15 RX ADMIN — IOPAMIDOL 100 ML: 755 INJECTION, SOLUTION INTRAVENOUS at 14:13

## 2025-08-26 ENCOUNTER — OFFICE VISIT (OUTPATIENT)
Dept: GASTROENTEROLOGY | Facility: CLINIC | Age: 60
End: 2025-08-26
Payer: COMMERCIAL

## 2025-08-26 VITALS
HEART RATE: 97 BPM | WEIGHT: 217 LBS | HEIGHT: 70 IN | DIASTOLIC BLOOD PRESSURE: 90 MMHG | SYSTOLIC BLOOD PRESSURE: 148 MMHG | BODY MASS INDEX: 31.07 KG/M2

## 2025-08-26 DIAGNOSIS — Z13.71 TESTING FOR GENETIC DISEASE CARRIER STATUS: ICD-10-CM

## 2025-08-26 DIAGNOSIS — K57.90 DIVERTICULOSIS: ICD-10-CM

## 2025-08-26 DIAGNOSIS — K21.9 GASTROESOPHAGEAL REFLUX DISEASE, UNSPECIFIED WHETHER ESOPHAGITIS PRESENT: Primary | ICD-10-CM

## 2025-08-26 DIAGNOSIS — Z86.0100 HISTORY OF COLON POLYPS: ICD-10-CM

## 2025-08-26 PROCEDURE — 99214 OFFICE O/P EST MOD 30 MIN: CPT | Performed by: NURSE PRACTITIONER

## 2025-08-26 RX ORDER — TRAMADOL HYDROCHLORIDE 50 MG/1
TABLET ORAL
COMMUNITY

## 2025-08-26 RX ORDER — OMEPRAZOLE 40 MG/1
40 CAPSULE, DELAYED RELEASE ORAL DAILY
Qty: 90 CAPSULE | Refills: 3 | Status: SHIPPED | OUTPATIENT
Start: 2025-08-26

## (undated) DEVICE — SOL IRRG H2O PL/BG 1000ML STRL

## (undated) DEVICE — THE SINGLE USE ETRAP – POLYP TRAP IS USED FOR SUCTION RETRIEVAL OF ENDOSCOPICALLY REMOVED POLYPS.: Brand: ETRAP

## (undated) DEVICE — Device: Brand: DEFENDO AIR/WATER/SUCTION AND BIOPSY VALVE

## (undated) DEVICE — SOLIDIFIER LIQLOC PLS 1500CC BT

## (undated) DEVICE — SUT MONOCRYL PLS ANTIB UND 3/0  PS1 27IN

## (undated) DEVICE — GLV SURG SENSICARE PI ORTHO SZ8 LF STRL

## (undated) DEVICE — BUR BRL FORMLA 12FLUT 4MM

## (undated) DEVICE — COLD THERAPY WRAP: Brand: DEROYAL

## (undated) DEVICE — CANN TWST IN TRPL DAM 7CM 8.25MM

## (undated) DEVICE — CONN JET HYDRA H20 AUXILIARY DISP

## (undated) DEVICE — SHOULDER ARTHROSCOPY-LF: Brand: MEDLINE INDUSTRIES, INC.

## (undated) DEVICE — INTENDED FOR TISSUE SEPARATION, AND OTHER PROCEDURES THAT REQUIRE A SHARP SURGICAL BLADE TO PUNCTURE OR CUT.: Brand: BARD-PARKER ® CARBON RIB-BACK BLADES

## (undated) DEVICE — 90-S CRUISE, SUCTION PROBE, NON-BENDABLE, MAX CUT LEVEL 1: Brand: SERFAS ENERGY

## (undated) DEVICE — GLV SURG SENSICARE SLT PF LF 7.5 STRL

## (undated) DEVICE — SLV DISTRACTION STAR VELCRO XL DISP

## (undated) DEVICE — LINER SURG CANSTR SXN S/RIGD 1500CC

## (undated) DEVICE — PAD GRND REM POLYHESIVE A/ DISP

## (undated) DEVICE — INTEGRATED CASSETTE TUBING, DO NOT USE IF PACKAGE IS DAMAGED: Brand: CROSSFLOW

## (undated) DEVICE — SLV SCD KN/LEN ADJ EXPRSS BLENDED MD 1P/U

## (undated) DEVICE — Device

## (undated) DEVICE — STERILE POLYISOPRENE POWDER-FREE SURGICAL GLOVES WITH EMOLLIENT COATING: Brand: PROTEXIS

## (undated) DEVICE — SHOULDER P.A.D. III: Brand: DEROYAL

## (undated) DEVICE — SNAR E/S POLYP SNAREMASTER OVL/10MM 2.8X2300MM YEL

## (undated) DEVICE — COLD THERAPY BLANKET: Brand: DEROYAL

## (undated) DEVICE — MAT FLR ABS W/BLU/LINER 56X72IN WHT

## (undated) DEVICE — SOL IRR NACL 0.9PCT 3000ML

## (undated) DEVICE — SUT PROLN 0 CT1 30IN 8424H

## (undated) DEVICE — SUT ETHLN 3-0 FS118IN 663H

## (undated) DEVICE — DRSNG GZ PETROLTM XEROFORM CURAD 1X8IN STRL

## (undated) DEVICE — BLD CUT FORMLA AGGR PLS 5.0MM